# Patient Record
Sex: FEMALE | Race: WHITE
[De-identification: names, ages, dates, MRNs, and addresses within clinical notes are randomized per-mention and may not be internally consistent; named-entity substitution may affect disease eponyms.]

---

## 2017-02-28 ENCOUNTER — TRANSCRIPTION ENCOUNTER (OUTPATIENT)
Age: 62
End: 2017-02-28

## 2017-05-08 ENCOUNTER — RX RENEWAL (OUTPATIENT)
Age: 62
End: 2017-05-08

## 2017-05-09 ENCOUNTER — RX RENEWAL (OUTPATIENT)
Age: 62
End: 2017-05-09

## 2017-06-08 ENCOUNTER — OUTPATIENT (OUTPATIENT)
Dept: OUTPATIENT SERVICES | Facility: HOSPITAL | Age: 62
LOS: 1 days | Discharge: HOME | End: 2017-06-08
Payer: COMMERCIAL

## 2017-06-08 DIAGNOSIS — K01.1 IMPACTED TEETH: ICD-10-CM

## 2017-06-08 PROCEDURE — 70486 CT MAXILLOFACIAL W/O DYE: CPT | Mod: 26

## 2017-07-17 ENCOUNTER — OUTPATIENT (OUTPATIENT)
Dept: OUTPATIENT SERVICES | Facility: HOSPITAL | Age: 62
LOS: 1 days | Discharge: HOME | End: 2017-07-17

## 2017-07-17 DIAGNOSIS — I10 ESSENTIAL (PRIMARY) HYPERTENSION: ICD-10-CM

## 2017-07-17 DIAGNOSIS — E53.8 DEFICIENCY OF OTHER SPECIFIED B GROUP VITAMINS: ICD-10-CM

## 2017-07-17 DIAGNOSIS — E23.2 DIABETES INSIPIDUS: ICD-10-CM

## 2017-07-17 DIAGNOSIS — E04.2 NONTOXIC MULTINODULAR GOITER: ICD-10-CM

## 2017-07-17 DIAGNOSIS — E55.9 VITAMIN D DEFICIENCY, UNSPECIFIED: ICD-10-CM

## 2017-07-31 ENCOUNTER — MED ADMIN CHARGE (OUTPATIENT)
Age: 62
End: 2017-07-31

## 2017-08-10 ENCOUNTER — RESULT REVIEW (OUTPATIENT)
Age: 62
End: 2017-08-10

## 2017-08-11 ENCOUNTER — APPOINTMENT (OUTPATIENT)
Dept: ULTRASOUND IMAGING | Facility: CLINIC | Age: 62
End: 2017-08-11
Payer: COMMERCIAL

## 2017-08-11 ENCOUNTER — APPOINTMENT (OUTPATIENT)
Dept: MAMMOGRAPHY | Facility: CLINIC | Age: 62
End: 2017-08-11
Payer: COMMERCIAL

## 2017-08-11 ENCOUNTER — OUTPATIENT (OUTPATIENT)
Dept: OUTPATIENT SERVICES | Facility: HOSPITAL | Age: 62
LOS: 1 days | End: 2017-08-11
Payer: COMMERCIAL

## 2017-08-11 DIAGNOSIS — Z00.8 ENCOUNTER FOR OTHER GENERAL EXAMINATION: ICD-10-CM

## 2017-08-11 PROCEDURE — G0279: CPT

## 2017-08-11 PROCEDURE — 76641 ULTRASOUND BREAST COMPLETE: CPT

## 2017-08-11 PROCEDURE — G0204: CPT | Mod: 26

## 2017-08-11 PROCEDURE — 76641 ULTRASOUND BREAST COMPLETE: CPT | Mod: 26,50

## 2017-08-11 PROCEDURE — G0279: CPT | Mod: 26

## 2017-08-11 PROCEDURE — 77066 DX MAMMO INCL CAD BI: CPT

## 2017-08-16 ENCOUNTER — APPOINTMENT (OUTPATIENT)
Dept: ULTRASOUND IMAGING | Facility: CLINIC | Age: 62
End: 2017-08-16

## 2017-08-22 ENCOUNTER — RESULT REVIEW (OUTPATIENT)
Age: 62
End: 2017-08-22

## 2017-08-22 ENCOUNTER — APPOINTMENT (OUTPATIENT)
Dept: ULTRASOUND IMAGING | Facility: CLINIC | Age: 62
End: 2017-08-22
Payer: COMMERCIAL

## 2017-08-22 ENCOUNTER — OUTPATIENT (OUTPATIENT)
Dept: OUTPATIENT SERVICES | Facility: HOSPITAL | Age: 62
LOS: 1 days | End: 2017-08-22
Payer: COMMERCIAL

## 2017-08-22 DIAGNOSIS — Z00.8 ENCOUNTER FOR OTHER GENERAL EXAMINATION: ICD-10-CM

## 2017-08-22 PROCEDURE — 77065 DX MAMMO INCL CAD UNI: CPT

## 2017-08-22 PROCEDURE — G0206: CPT | Mod: 26,LT

## 2017-08-22 PROCEDURE — 19083 BX BREAST 1ST LESION US IMAG: CPT | Mod: LT

## 2017-08-22 PROCEDURE — A4648: CPT

## 2017-08-22 PROCEDURE — 19083 BX BREAST 1ST LESION US IMAG: CPT

## 2017-08-28 ENCOUNTER — APPOINTMENT (OUTPATIENT)
Dept: BREAST CENTER | Facility: CLINIC | Age: 62
End: 2017-08-28
Payer: COMMERCIAL

## 2017-08-28 ENCOUNTER — FORM ENCOUNTER (OUTPATIENT)
Age: 62
End: 2017-08-28

## 2017-08-28 VITALS
HEART RATE: 80 BPM | SYSTOLIC BLOOD PRESSURE: 140 MMHG | WEIGHT: 194 LBS | DIASTOLIC BLOOD PRESSURE: 90 MMHG | HEIGHT: 61 IN | BODY MASS INDEX: 36.63 KG/M2

## 2017-08-28 DIAGNOSIS — Z83.511 FAMILY HISTORY OF GLAUCOMA: ICD-10-CM

## 2017-08-28 DIAGNOSIS — F15.90 OTHER STIMULANT USE, UNSPECIFIED, UNCOMPLICATED: ICD-10-CM

## 2017-08-28 DIAGNOSIS — Z83.3 FAMILY HISTORY OF DIABETES MELLITUS: ICD-10-CM

## 2017-08-28 DIAGNOSIS — Z82.3 FAMILY HISTORY OF STROKE: ICD-10-CM

## 2017-08-28 DIAGNOSIS — Z80.8 FAMILY HISTORY OF MALIGNANT NEOPLASM OF OTHER ORGANS OR SYSTEMS: ICD-10-CM

## 2017-08-28 PROCEDURE — 99244 OFF/OP CNSLTJ NEW/EST MOD 40: CPT

## 2017-08-28 RX ORDER — DESMOPRESSIN ACETATE 0.1 MG/1
0.1 TABLET ORAL
Refills: 0 | Status: ACTIVE | COMMUNITY

## 2017-08-29 ENCOUNTER — OUTPATIENT (OUTPATIENT)
Dept: OUTPATIENT SERVICES | Facility: HOSPITAL | Age: 62
LOS: 1 days | End: 2017-08-29
Payer: COMMERCIAL

## 2017-08-29 ENCOUNTER — APPOINTMENT (OUTPATIENT)
Dept: MRI IMAGING | Facility: CLINIC | Age: 62
End: 2017-08-29
Payer: COMMERCIAL

## 2017-08-29 DIAGNOSIS — Z00.8 ENCOUNTER FOR OTHER GENERAL EXAMINATION: ICD-10-CM

## 2017-08-29 PROCEDURE — 82565 ASSAY OF CREATININE: CPT

## 2017-08-29 PROCEDURE — 0159T: CPT | Mod: 26

## 2017-08-29 PROCEDURE — C8937: CPT

## 2017-08-29 PROCEDURE — C8908: CPT

## 2017-08-29 PROCEDURE — A9585: CPT

## 2017-08-29 PROCEDURE — 77059 MRI BREAST BILATERAL: CPT | Mod: 26

## 2017-09-05 ENCOUNTER — RESULT REVIEW (OUTPATIENT)
Age: 62
End: 2017-09-05

## 2017-09-05 ENCOUNTER — OUTPATIENT (OUTPATIENT)
Dept: OUTPATIENT SERVICES | Facility: HOSPITAL | Age: 62
LOS: 1 days | Discharge: HOME | End: 2017-09-05
Payer: COMMERCIAL

## 2017-09-05 DIAGNOSIS — C50.212 MALIGNANT NEOPLASM OF UPPER-INNER QUADRANT OF LEFT FEMALE BREAST: ICD-10-CM

## 2017-09-05 PROCEDURE — 88321 CONSLTJ&REPRT SLD PREP ELSWR: CPT

## 2017-09-06 ENCOUNTER — APPOINTMENT (OUTPATIENT)
Dept: BREAST CENTER | Facility: CLINIC | Age: 62
End: 2017-09-06
Payer: COMMERCIAL

## 2017-09-06 PROCEDURE — 99214 OFFICE O/P EST MOD 30 MIN: CPT

## 2017-09-08 LAB — SURGICAL PATHOLOGY FINAL REPORT - CH: SIGNIFICANT CHANGE UP

## 2017-09-15 ENCOUNTER — OUTPATIENT (OUTPATIENT)
Dept: OUTPATIENT SERVICES | Facility: HOSPITAL | Age: 62
LOS: 1 days | Discharge: HOME | End: 2017-09-15
Payer: COMMERCIAL

## 2017-09-15 DIAGNOSIS — Z86.79 PERSONAL HISTORY OF OTHER DISEASES OF THE CIRCULATORY SYSTEM: Chronic | ICD-10-CM

## 2017-09-15 DIAGNOSIS — C50.212 MALIGNANT NEOPLASM OF UPPER-INNER QUADRANT OF LEFT FEMALE BREAST: ICD-10-CM

## 2017-09-15 LAB
ALBUMIN SERPL ELPH-MCNC: 3.5 G/DL — SIGNIFICANT CHANGE UP (ref 3.3–5)
ALP SERPL-CCNC: 100 U/L — SIGNIFICANT CHANGE UP (ref 40–120)
ALT FLD-CCNC: 19 U/L — SIGNIFICANT CHANGE UP (ref 12–78)
ANION GAP SERPL CALC-SCNC: 3 MMOL/L — LOW (ref 5–17)
AST SERPL-CCNC: 13 U/L — LOW (ref 15–37)
BASOPHILS # BLD AUTO: 0.1 K/UL — SIGNIFICANT CHANGE UP (ref 0–0.2)
BASOPHILS NFR BLD AUTO: 1.4 % — SIGNIFICANT CHANGE UP (ref 0–2)
BILIRUB SERPL-MCNC: 0.6 MG/DL — SIGNIFICANT CHANGE UP (ref 0.2–1.2)
BUN SERPL-MCNC: 14 MG/DL — SIGNIFICANT CHANGE UP (ref 7–23)
CALCIUM SERPL-MCNC: 9 MG/DL — SIGNIFICANT CHANGE UP (ref 8.5–10.1)
CHLORIDE SERPL-SCNC: 108 MMOL/L — SIGNIFICANT CHANGE UP (ref 96–108)
CO2 SERPL-SCNC: 28 MMOL/L — SIGNIFICANT CHANGE UP (ref 22–31)
CREAT SERPL-MCNC: 0.79 MG/DL — SIGNIFICANT CHANGE UP (ref 0.5–1.3)
EOSINOPHIL # BLD AUTO: 0.7 K/UL — HIGH (ref 0–0.5)
EOSINOPHIL NFR BLD AUTO: 6.2 % — HIGH (ref 0–6)
GLUCOSE SERPL-MCNC: 95 MG/DL — SIGNIFICANT CHANGE UP (ref 70–99)
HCT VFR BLD CALC: 44.9 % — SIGNIFICANT CHANGE UP (ref 34.5–45)
HGB BLD-MCNC: 14.6 G/DL — SIGNIFICANT CHANGE UP (ref 11.5–15.5)
INR BLD: 1.17 RATIO — HIGH (ref 0.88–1.16)
LYMPHOCYTES # BLD AUTO: 3.3 K/UL — SIGNIFICANT CHANGE UP (ref 1–3.3)
LYMPHOCYTES # BLD AUTO: 30.4 % — SIGNIFICANT CHANGE UP (ref 13–44)
MCHC RBC-ENTMCNC: 29.4 PG — SIGNIFICANT CHANGE UP (ref 27–34)
MCHC RBC-ENTMCNC: 32.5 GM/DL — SIGNIFICANT CHANGE UP (ref 32–36)
MCV RBC AUTO: 90.7 FL — SIGNIFICANT CHANGE UP (ref 80–100)
MONOCYTES # BLD AUTO: 0.8 K/UL — SIGNIFICANT CHANGE UP (ref 0–0.9)
MONOCYTES NFR BLD AUTO: 7.5 % — SIGNIFICANT CHANGE UP (ref 2–14)
NEUTROPHILS # BLD AUTO: 5.9 K/UL — SIGNIFICANT CHANGE UP (ref 1.8–7.4)
NEUTROPHILS NFR BLD AUTO: 54.5 % — SIGNIFICANT CHANGE UP (ref 43–77)
PLATELET # BLD AUTO: 167 K/UL — SIGNIFICANT CHANGE UP (ref 150–400)
POTASSIUM SERPL-MCNC: 4.4 MMOL/L — SIGNIFICANT CHANGE UP (ref 3.5–5.3)
POTASSIUM SERPL-SCNC: 4.4 MMOL/L — SIGNIFICANT CHANGE UP (ref 3.5–5.3)
PROT SERPL-MCNC: 7.4 GM/DL — SIGNIFICANT CHANGE UP (ref 6–8.3)
PROTHROM AB SERPL-ACNC: 12.7 SEC — SIGNIFICANT CHANGE UP (ref 9.8–12.7)
RBC # BLD: 4.95 M/UL — SIGNIFICANT CHANGE UP (ref 3.8–5.2)
RBC # FLD: 11.9 % — SIGNIFICANT CHANGE UP (ref 10.3–14.5)
SODIUM SERPL-SCNC: 139 MMOL/L — SIGNIFICANT CHANGE UP (ref 135–145)
WBC # BLD: 10.8 K/UL — HIGH (ref 3.8–10.5)
WBC # FLD AUTO: 10.8 K/UL — HIGH (ref 3.8–10.5)

## 2017-09-15 PROCEDURE — 71020: CPT | Mod: 26

## 2017-09-15 PROCEDURE — 93010 ELECTROCARDIOGRAM REPORT: CPT

## 2017-09-15 NOTE — ASU PATIENT PROFILE, ADULT - PMH
Chronic atrial fibrillation    Chronic low back pain without sciatica, unspecified back pain laterality    Diabetes insipidus    Essential hypertension    Hyperlipidemia, unspecified hyperlipidemia type    Malignant neoplasm of left breast in female, estrogen receptor positive, unspecified site of breast    Palpitation    Situational anxiety    Thyroid cyst  drained  Varicose vein of leg  right lower extremity surgery Chronic atrial fibrillation    Chronic low back pain without sciatica, unspecified back pain laterality    Diabetes insipidus    Essential hypertension    Hyperlipidemia, unspecified hyperlipidemia type    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  Malignant neoplasm of left breast in female, estrogen receptor positive, unspecified site of breast    Palpitation    Situational anxiety    Thyroid cyst  drained  Varicose vein of leg  right lower extremity surgery

## 2017-09-15 NOTE — ASU PATIENT PROFILE, ADULT - VISION (WITH CORRECTIVE LENSES IF THE PATIENT USUALLY WEARS THEM):
Normal vision: sees adequately in most situations; can see medication labels, newsprint/progressive lenses

## 2017-09-21 ENCOUNTER — FORM ENCOUNTER (OUTPATIENT)
Age: 62
End: 2017-09-21

## 2017-09-22 ENCOUNTER — APPOINTMENT (OUTPATIENT)
Dept: ULTRASOUND IMAGING | Facility: CLINIC | Age: 62
End: 2017-09-22
Payer: COMMERCIAL

## 2017-09-22 ENCOUNTER — OUTPATIENT (OUTPATIENT)
Dept: OUTPATIENT SERVICES | Facility: HOSPITAL | Age: 62
LOS: 1 days | End: 2017-09-22
Payer: COMMERCIAL

## 2017-09-22 DIAGNOSIS — Z86.79 PERSONAL HISTORY OF OTHER DISEASES OF THE CIRCULATORY SYSTEM: Chronic | ICD-10-CM

## 2017-09-22 DIAGNOSIS — Z00.8 ENCOUNTER FOR OTHER GENERAL EXAMINATION: ICD-10-CM

## 2017-09-22 PROCEDURE — C1739: CPT

## 2017-09-22 PROCEDURE — 19285 PERQ DEV BREAST 1ST US IMAG: CPT

## 2017-09-22 PROCEDURE — 19285 PERQ DEV BREAST 1ST US IMAG: CPT | Mod: LT

## 2017-09-26 PROBLEM — Z13.79 GENETIC TESTING: Status: RESOLVED | Noted: 2017-09-26 | Resolved: 2017-09-26

## 2017-09-28 RX ORDER — ONDANSETRON 8 MG/1
4 TABLET, FILM COATED ORAL ONCE
Qty: 0 | Refills: 0 | Status: COMPLETED | OUTPATIENT
Start: 2017-09-29 | End: 2017-09-29

## 2017-09-28 RX ORDER — SODIUM CHLORIDE 9 MG/ML
1000 INJECTION, SOLUTION INTRAVENOUS
Qty: 0 | Refills: 0 | Status: DISCONTINUED | OUTPATIENT
Start: 2017-09-29 | End: 2017-09-29

## 2017-09-29 ENCOUNTER — OUTPATIENT (OUTPATIENT)
Dept: OUTPATIENT SERVICES | Facility: HOSPITAL | Age: 62
LOS: 1 days | Discharge: HOME | End: 2017-09-29
Payer: COMMERCIAL

## 2017-09-29 ENCOUNTER — RESULT REVIEW (OUTPATIENT)
Age: 62
End: 2017-09-29

## 2017-09-29 ENCOUNTER — APPOINTMENT (OUTPATIENT)
Dept: BREAST CENTER | Facility: HOSPITAL | Age: 62
End: 2017-09-29
Payer: COMMERCIAL

## 2017-09-29 VITALS
OXYGEN SATURATION: 97 % | HEART RATE: 52 BPM | TEMPERATURE: 98 F | DIASTOLIC BLOOD PRESSURE: 73 MMHG | SYSTOLIC BLOOD PRESSURE: 136 MMHG | RESPIRATION RATE: 14 BRPM

## 2017-09-29 VITALS
WEIGHT: 197.09 LBS | HEIGHT: 61 IN | TEMPERATURE: 99 F | HEART RATE: 52 BPM | RESPIRATION RATE: 12 BRPM | DIASTOLIC BLOOD PRESSURE: 64 MMHG | SYSTOLIC BLOOD PRESSURE: 134 MMHG

## 2017-09-29 DIAGNOSIS — Z13.79 ENCOUNTER FOR OTHER SCREENING FOR GENETIC AND CHROMOSOMAL ANOMALIES: ICD-10-CM

## 2017-09-29 DIAGNOSIS — Z86.79 PERSONAL HISTORY OF OTHER DISEASES OF THE CIRCULATORY SYSTEM: Chronic | ICD-10-CM

## 2017-09-29 PROCEDURE — 88305 TISSUE EXAM BY PATHOLOGIST: CPT | Mod: 26

## 2017-09-29 PROCEDURE — 38792 RA TRACER ID OF SENTINL NODE: CPT

## 2017-09-29 PROCEDURE — 88329 PATH CONSLTJ DRG SURG: CPT

## 2017-09-29 PROCEDURE — 38525 BIOPSY/REMOVAL LYMPH NODES: CPT

## 2017-09-29 PROCEDURE — 19499 UNLISTED PROCEDURE BREAST: CPT

## 2017-09-29 PROCEDURE — 76098 X-RAY EXAM SURGICAL SPECIMEN: CPT | Mod: 26

## 2017-09-29 PROCEDURE — 19301 PARTIAL MASTECTOMY: CPT

## 2017-09-29 PROCEDURE — 78806: CPT | Mod: 26

## 2017-09-29 PROCEDURE — 88307 TISSUE EXAM BY PATHOLOGIST: CPT | Mod: 26

## 2017-09-29 RX ORDER — OXYCODONE HYDROCHLORIDE 5 MG/1
10 TABLET ORAL EVERY 6 HOURS
Qty: 0 | Refills: 0 | Status: DISCONTINUED | OUTPATIENT
Start: 2017-09-29 | End: 2017-09-29

## 2017-09-29 RX ORDER — ASPIRIN/CALCIUM CARB/MAGNESIUM 324 MG
1 TABLET ORAL
Qty: 0 | Refills: 0 | COMMUNITY

## 2017-09-29 RX ORDER — OXYCODONE AND ACETAMINOPHEN 5; 325 MG/1; MG/1
2 TABLET ORAL EVERY 6 HOURS
Qty: 0 | Refills: 0 | Status: DISCONTINUED | OUTPATIENT
Start: 2017-09-29 | End: 2017-09-29

## 2017-09-29 RX ORDER — OXYCODONE HYDROCHLORIDE 5 MG/1
1 TABLET ORAL
Qty: 20 | Refills: 0 | OUTPATIENT
Start: 2017-09-29

## 2017-09-29 RX ORDER — FENTANYL CITRATE 50 UG/ML
50 INJECTION INTRAVENOUS
Qty: 0 | Refills: 0 | Status: DISCONTINUED | OUTPATIENT
Start: 2017-09-29 | End: 2017-09-29

## 2017-09-29 RX ORDER — ONDANSETRON 8 MG/1
4 TABLET, FILM COATED ORAL EVERY 6 HOURS
Qty: 0 | Refills: 0 | Status: DISCONTINUED | OUTPATIENT
Start: 2017-09-29 | End: 2017-10-14

## 2017-09-29 RX ORDER — OXYCODONE AND ACETAMINOPHEN 5; 325 MG/1; MG/1
1 TABLET ORAL EVERY 4 HOURS
Qty: 0 | Refills: 0 | Status: DISCONTINUED | OUTPATIENT
Start: 2017-09-29 | End: 2017-09-29

## 2017-09-29 RX ORDER — ACETAMINOPHEN 500 MG
1000 TABLET ORAL ONCE
Qty: 0 | Refills: 0 | Status: COMPLETED | OUTPATIENT
Start: 2017-09-29 | End: 2017-09-29

## 2017-09-29 RX ADMIN — Medication 400 MILLIGRAM(S): at 16:30

## 2017-09-29 RX ADMIN — SODIUM CHLORIDE 75 MILLILITER(S): 9 INJECTION, SOLUTION INTRAVENOUS at 16:15

## 2017-09-29 RX ADMIN — ONDANSETRON 4 MILLIGRAM(S): 8 TABLET, FILM COATED ORAL at 15:53

## 2017-09-29 NOTE — ASU PATIENT PROFILE, ADULT - VISION (WITH CORRECTIVE LENSES IF THE PATIENT USUALLY WEARS THEM):
progressive lenses/Normal vision: sees adequately in most situations; can see medication labels, newsprint

## 2017-09-29 NOTE — ASU PATIENT PROFILE, ADULT - PMH
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  Malignant neoplasm of left breast in female, estrogen receptor positive, unspecified site of breast    Palpitation    Situational anxiety    Thyroid cyst  drained  Varicose vein of leg  right lower extremity surgery

## 2017-09-29 NOTE — BRIEF OPERATIVE NOTE - OPERATION/FINDINGS
Localizing clip and savvi clip seen on specimen radiograph.  Margin probe showed a positive medial margin.

## 2017-09-29 NOTE — ASU DISCHARGE PLAN (ADULT/PEDIATRIC). - MEDICATION SUMMARY - MEDICATIONS TO TAKE
I will START or STAY ON the medications listed below when I get home from the hospital:    oxyCODONE 5 mg oral tablet  -- 1 -2 tab(s) by mouth every 4 hours, As Needed -for moderate pain MDD:12  -- Caution federal law prohibits the transfer of this drug to any person other  than the person for whom it was prescribed.  It is very important that you take or use this exactly as directed.  Do not skip doses or discontinue unless directed by your doctor.  May cause drowsiness.  Alcohol may intensify this effect.  Use care when operating dangerous machinery.  This prescription cannot be refilled.  Using more of this medication than prescribed may cause serious breathing problems.    -- Indication: For postop pain    sotalol 120 mg oral tablet  -- 1 tab(s) by mouth 2 times a day  -- Indication: For arrythmia    desmopressin 0.1 mg oral tablet  -- 1 tab(s) by mouth 2 times a day  -- Indication: For DI

## 2017-09-29 NOTE — BRIEF OPERATIVE NOTE - PROCEDURE
<<-----Click on this checkbox to enter Procedure Biopsy of axillary sentinel lymph node  09/29/2017  Left  Active  [facility]  Lumpectomy  09/29/2017  Left lumpectomy with savvi localization and margin assessment with Margin Probe  Active  [facility]

## 2017-10-05 ENCOUNTER — APPOINTMENT (OUTPATIENT)
Dept: BREAST CENTER | Facility: CLINIC | Age: 62
End: 2017-10-05
Payer: COMMERCIAL

## 2017-10-05 LAB — SURGICAL PATHOLOGY FINAL REPORT - CH: SIGNIFICANT CHANGE UP

## 2017-10-05 PROCEDURE — 99024 POSTOP FOLLOW-UP VISIT: CPT

## 2017-10-11 DIAGNOSIS — D05.12 INTRADUCTAL CARCINOMA IN SITU OF LEFT BREAST: ICD-10-CM

## 2017-10-11 DIAGNOSIS — E23.2 DIABETES INSIPIDUS: ICD-10-CM

## 2017-10-11 DIAGNOSIS — Z79.82 LONG TERM (CURRENT) USE OF ASPIRIN: ICD-10-CM

## 2017-10-11 DIAGNOSIS — I48.0 PAROXYSMAL ATRIAL FIBRILLATION: ICD-10-CM

## 2017-10-11 DIAGNOSIS — I10 ESSENTIAL (PRIMARY) HYPERTENSION: ICD-10-CM

## 2017-10-25 PROBLEM — I48.91 ATRIAL FIBRILLATION: Status: ACTIVE | Noted: 2017-05-08

## 2017-10-30 ENCOUNTER — APPOINTMENT (OUTPATIENT)
Dept: HEMATOLOGY ONCOLOGY | Facility: CLINIC | Age: 62
End: 2017-10-30
Payer: COMMERCIAL

## 2017-10-30 DIAGNOSIS — I48.91 UNSPECIFIED ATRIAL FIBRILLATION: ICD-10-CM

## 2017-11-03 ENCOUNTER — APPOINTMENT (OUTPATIENT)
Dept: HEMATOLOGY ONCOLOGY | Facility: CLINIC | Age: 62
End: 2017-11-03
Payer: COMMERCIAL

## 2017-11-03 VITALS
WEIGHT: 195 LBS | HEIGHT: 61 IN | BODY MASS INDEX: 36.82 KG/M2 | TEMPERATURE: 98.3 F | SYSTOLIC BLOOD PRESSURE: 132 MMHG | HEART RATE: 108 BPM | DIASTOLIC BLOOD PRESSURE: 95 MMHG

## 2017-11-03 DIAGNOSIS — Z78.9 OTHER SPECIFIED HEALTH STATUS: ICD-10-CM

## 2017-11-03 PROCEDURE — 99244 OFF/OP CNSLTJ NEW/EST MOD 40: CPT

## 2017-12-03 ENCOUNTER — EMERGENCY (EMERGENCY)
Facility: HOSPITAL | Age: 62
LOS: 0 days | Discharge: HOME | End: 2017-12-03
Attending: EMERGENCY MEDICINE | Admitting: EMERGENCY MEDICINE
Payer: COMMERCIAL

## 2017-12-03 VITALS — WEIGHT: 195.11 LBS | HEIGHT: 61 IN

## 2017-12-03 VITALS — SYSTOLIC BLOOD PRESSURE: 154 MMHG | DIASTOLIC BLOOD PRESSURE: 78 MMHG

## 2017-12-03 DIAGNOSIS — I10 ESSENTIAL (PRIMARY) HYPERTENSION: ICD-10-CM

## 2017-12-03 DIAGNOSIS — R51 HEADACHE: ICD-10-CM

## 2017-12-03 DIAGNOSIS — Z86.79 PERSONAL HISTORY OF OTHER DISEASES OF THE CIRCULATORY SYSTEM: Chronic | ICD-10-CM

## 2017-12-03 DIAGNOSIS — I48.91 UNSPECIFIED ATRIAL FIBRILLATION: ICD-10-CM

## 2017-12-03 PROCEDURE — 99285 EMERGENCY DEPT VISIT HI MDM: CPT

## 2017-12-03 PROCEDURE — 70450 CT HEAD/BRAIN W/O DYE: CPT | Mod: 26

## 2017-12-03 PROCEDURE — 93010 ELECTROCARDIOGRAM REPORT: CPT

## 2017-12-03 RX ORDER — AMLODIPINE BESYLATE 2.5 MG/1
1 TABLET ORAL
Qty: 15 | Refills: 0 | OUTPATIENT
Start: 2017-12-03

## 2017-12-03 RX ORDER — ACETAMINOPHEN 500 MG
1000 TABLET ORAL ONCE
Qty: 0 | Refills: 0 | Status: COMPLETED | OUTPATIENT
Start: 2017-12-03 | End: 2017-12-03

## 2017-12-03 RX ORDER — AMLODIPINE BESYLATE 2.5 MG/1
5 TABLET ORAL ONCE
Qty: 0 | Refills: 0 | Status: COMPLETED | OUTPATIENT
Start: 2017-12-03 | End: 2017-12-03

## 2017-12-03 RX ADMIN — AMLODIPINE BESYLATE 5 MILLIGRAM(S): 2.5 TABLET ORAL at 20:50

## 2017-12-03 RX ADMIN — Medication 1000 MILLIGRAM(S): at 20:51

## 2017-12-03 NOTE — ED STATDOCS - OBJECTIVE STATEMENT
61 y/o female with a PMHx of Afib presents to the ED c/o hypertension x 3 days.  Pt notes high of 195. Took Lisinopril 40mg twice today x few days with no relief. +HA. No visual changes. On 120mg Sotalol BIB, ASA. denies fever. denies neck pain. no chest pain or sob. no abd pain. no n/v/d. no urinary f/u/d. no back pain. no motor or sensory deficits. denies illicit drug use. no recent travel. no rash. no other acute issues symptoms or concerns

## 2017-12-03 NOTE — ED STATDOCS - MEDICAL DECISION MAKING DETAILS
return to ed for intractable HA, persistent vomiting, or new onset motor/sensory deficits   neuro exam showing neuro: CN II - XII intact, EOMI, PERRL, no papilledema, 5/5 muscle strength x 4 extremities, no sensory deficits, 2+ dtr globally, negative babinski, no ataxic gait, normal ZACHARY and FNT, normal romberg

## 2017-12-03 NOTE — ED STATDOCS - PROGRESS NOTE DETAILS
Patient seen and evaluated.  Head CT reviewed, no acute findings.  BP improved to 158/78.  Will give tylenol for HA and write rx for lisinopril.  She will follow up with her PMD Dr. Kavitha Mckenna PA-C

## 2017-12-03 NOTE — ED STATDOCS - ATTENDING CONTRIBUTION TO CARE
I, Blas Dia, performed the initial face to face bedside interview with this patient regarding history of present illness, review of symptoms and relevant past medical, social and family history.  I completed an independent physical examination.  I was the initial provider who evaluated this patient.  The history, relevant review of systems, past medical and surgical history, medical decision making, and physical examination was documented by the scribe in my presence and I attest to the accuracy of the documentation.  I have signed out the follow up of any pending tests (i.e. labs, radiological studies) to the ACP.  I have communicated the patient’s plan of care and disposition with the ACP.

## 2017-12-03 NOTE — ED STATDOCS - NEUROLOGICAL, MLM
neuro: CN II - XII intact, EOMI, PERRL, no papilledema, 5/5 muscle strength x 4 extremities, no sensory deficits, 2+ dtr globally, negative babinski, no ataxic gait, normal ZACHARY and FNT, normal romberg

## 2018-01-25 ENCOUNTER — RESULT REVIEW (OUTPATIENT)
Age: 63
End: 2018-01-25

## 2018-02-13 ENCOUNTER — LABORATORY RESULT (OUTPATIENT)
Age: 63
End: 2018-02-13

## 2018-02-13 ENCOUNTER — APPOINTMENT (OUTPATIENT)
Dept: BREAST CENTER | Facility: CLINIC | Age: 63
End: 2018-02-13
Payer: COMMERCIAL

## 2018-02-13 VITALS
DIASTOLIC BLOOD PRESSURE: 78 MMHG | HEART RATE: 86 BPM | WEIGHT: 195 LBS | HEIGHT: 61 IN | BODY MASS INDEX: 36.82 KG/M2 | SYSTOLIC BLOOD PRESSURE: 132 MMHG

## 2018-02-13 PROCEDURE — 99214 OFFICE O/P EST MOD 30 MIN: CPT | Mod: 25

## 2018-02-13 PROCEDURE — 76642 ULTRASOUND BREAST LIMITED: CPT

## 2018-02-13 PROCEDURE — 10022: CPT

## 2018-02-16 ENCOUNTER — OUTPATIENT (OUTPATIENT)
Dept: OUTPATIENT SERVICES | Facility: HOSPITAL | Age: 63
LOS: 1 days | Discharge: HOME | End: 2018-02-16

## 2018-02-16 DIAGNOSIS — E55.9 VITAMIN D DEFICIENCY, UNSPECIFIED: ICD-10-CM

## 2018-02-16 DIAGNOSIS — E04.2 NONTOXIC MULTINODULAR GOITER: ICD-10-CM

## 2018-02-16 DIAGNOSIS — E53.8 DEFICIENCY OF OTHER SPECIFIED B GROUP VITAMINS: ICD-10-CM

## 2018-02-16 DIAGNOSIS — Z86.79 PERSONAL HISTORY OF OTHER DISEASES OF THE CIRCULATORY SYSTEM: Chronic | ICD-10-CM

## 2018-02-16 DIAGNOSIS — E23.2 DIABETES INSIPIDUS: ICD-10-CM

## 2018-02-26 ENCOUNTER — MEDICATION RENEWAL (OUTPATIENT)
Age: 63
End: 2018-02-26

## 2018-02-26 RX ORDER — SOTALOL HYDROCHLORIDE 120 MG/1
120 TABLET ORAL TWICE DAILY
Qty: 60 | Refills: 5 | Status: ACTIVE | COMMUNITY
Start: 2017-07-31 | End: 1900-01-01

## 2018-03-01 ENCOUNTER — TRANSCRIPTION ENCOUNTER (OUTPATIENT)
Age: 63
End: 2018-03-01

## 2018-07-23 PROBLEM — Z78.9 ALCOHOL USE: Status: ACTIVE | Noted: 2017-08-28

## 2018-07-30 ENCOUNTER — INPATIENT (INPATIENT)
Facility: HOSPITAL | Age: 63
LOS: 0 days | Discharge: HOME | End: 2018-07-31
Attending: INTERNAL MEDICINE | Admitting: INTERNAL MEDICINE
Payer: COMMERCIAL

## 2018-07-30 VITALS
WEIGHT: 149.91 LBS | OXYGEN SATURATION: 99 % | SYSTOLIC BLOOD PRESSURE: 176 MMHG | HEIGHT: 60 IN | DIASTOLIC BLOOD PRESSURE: 94 MMHG | HEART RATE: 63 BPM

## 2018-07-30 DIAGNOSIS — Z86.79 PERSONAL HISTORY OF OTHER DISEASES OF THE CIRCULATORY SYSTEM: Chronic | ICD-10-CM

## 2018-07-30 PROBLEM — D76.3 OTHER HISTIOCYTOSIS SYNDROMES: Chronic | Status: ACTIVE | Noted: 2017-09-15

## 2018-07-30 PROBLEM — I83.90 ASYMPTOMATIC VARICOSE VEINS OF UNSPECIFIED LOWER EXTREMITY: Chronic | Status: ACTIVE | Noted: 2017-09-15

## 2018-07-30 PROBLEM — E78.5 HYPERLIPIDEMIA, UNSPECIFIED: Chronic | Status: ACTIVE | Noted: 2017-09-15

## 2018-07-30 PROBLEM — I10 ESSENTIAL (PRIMARY) HYPERTENSION: Chronic | Status: ACTIVE | Noted: 2017-09-15

## 2018-07-30 PROBLEM — E23.2 DIABETES INSIPIDUS: Chronic | Status: ACTIVE | Noted: 2017-09-15

## 2018-07-30 PROBLEM — I48.2 CHRONIC ATRIAL FIBRILLATION: Chronic | Status: ACTIVE | Noted: 2017-09-15

## 2018-07-30 PROBLEM — M54.5 LOW BACK PAIN: Chronic | Status: ACTIVE | Noted: 2017-09-15

## 2018-07-30 PROBLEM — R00.2 PALPITATIONS: Chronic | Status: ACTIVE | Noted: 2017-09-15

## 2018-07-30 PROBLEM — C50.912 MALIGNANT NEOPLASM OF UNSPECIFIED SITE OF LEFT FEMALE BREAST: Chronic | Status: ACTIVE | Noted: 2017-09-15

## 2018-07-30 LAB
ALBUMIN SERPL ELPH-MCNC: 3.6 G/DL — SIGNIFICANT CHANGE UP (ref 3.3–5)
ALLERGY+IMMUNOLOGY DIAG STUDY NOTE: SIGNIFICANT CHANGE UP
ALP SERPL-CCNC: 111 U/L — SIGNIFICANT CHANGE UP (ref 40–120)
ALT FLD-CCNC: 24 U/L — SIGNIFICANT CHANGE UP (ref 12–78)
ANION GAP SERPL CALC-SCNC: 6 MMOL/L — SIGNIFICANT CHANGE UP (ref 5–17)
AST SERPL-CCNC: 15 U/L — SIGNIFICANT CHANGE UP (ref 15–37)
BASOPHILS # BLD AUTO: 0.07 K/UL — SIGNIFICANT CHANGE UP (ref 0–0.2)
BASOPHILS NFR BLD AUTO: 0.5 % — SIGNIFICANT CHANGE UP (ref 0–2)
BILIRUB SERPL-MCNC: 0.4 MG/DL — SIGNIFICANT CHANGE UP (ref 0.2–1.2)
BUN SERPL-MCNC: 16 MG/DL — SIGNIFICANT CHANGE UP (ref 7–23)
CALCIUM SERPL-MCNC: 9.1 MG/DL — SIGNIFICANT CHANGE UP (ref 8.5–10.1)
CHLORIDE SERPL-SCNC: 107 MMOL/L — SIGNIFICANT CHANGE UP (ref 96–108)
CO2 SERPL-SCNC: 29 MMOL/L — SIGNIFICANT CHANGE UP (ref 22–31)
CREAT SERPL-MCNC: 0.91 MG/DL — SIGNIFICANT CHANGE UP (ref 0.5–1.3)
EOSINOPHIL # BLD AUTO: 0.39 K/UL — SIGNIFICANT CHANGE UP (ref 0–0.5)
EOSINOPHIL NFR BLD AUTO: 3.1 % — SIGNIFICANT CHANGE UP (ref 0–6)
GLUCOSE BLDC GLUCOMTR-MCNC: 87 MG/DL — SIGNIFICANT CHANGE UP (ref 70–99)
GLUCOSE SERPL-MCNC: 101 MG/DL — HIGH (ref 70–99)
HCT VFR BLD CALC: 43.3 % — SIGNIFICANT CHANGE UP (ref 34.5–45)
HGB BLD-MCNC: 14.2 G/DL — SIGNIFICANT CHANGE UP (ref 11.5–15.5)
IMM GRANULOCYTES NFR BLD AUTO: 0.5 % — SIGNIFICANT CHANGE UP (ref 0–1.5)
LYMPHOCYTES # BLD AUTO: 2.84 K/UL — SIGNIFICANT CHANGE UP (ref 1–3.3)
LYMPHOCYTES # BLD AUTO: 22.3 % — SIGNIFICANT CHANGE UP (ref 13–44)
MCHC RBC-ENTMCNC: 29.7 PG — SIGNIFICANT CHANGE UP (ref 27–34)
MCHC RBC-ENTMCNC: 32.8 GM/DL — SIGNIFICANT CHANGE UP (ref 32–36)
MCV RBC AUTO: 90.6 FL — SIGNIFICANT CHANGE UP (ref 80–100)
MONOCYTES # BLD AUTO: 1.26 K/UL — HIGH (ref 0–0.9)
MONOCYTES NFR BLD AUTO: 9.9 % — SIGNIFICANT CHANGE UP (ref 2–14)
NEUTROPHILS # BLD AUTO: 8.1 K/UL — HIGH (ref 1.8–7.4)
NEUTROPHILS NFR BLD AUTO: 63.7 % — SIGNIFICANT CHANGE UP (ref 43–77)
NRBC # BLD: 0 /100 WBCS — SIGNIFICANT CHANGE UP (ref 0–0)
PLATELET # BLD AUTO: 151 K/UL — SIGNIFICANT CHANGE UP (ref 150–400)
POTASSIUM SERPL-MCNC: 4.1 MMOL/L — SIGNIFICANT CHANGE UP (ref 3.5–5.3)
POTASSIUM SERPL-SCNC: 4.1 MMOL/L — SIGNIFICANT CHANGE UP (ref 3.5–5.3)
PROT SERPL-MCNC: 7.9 GM/DL — SIGNIFICANT CHANGE UP (ref 6–8.3)
RBC # BLD: 4.78 M/UL — SIGNIFICANT CHANGE UP (ref 3.8–5.2)
RBC # FLD: 13 % — SIGNIFICANT CHANGE UP (ref 10.3–14.5)
SODIUM SERPL-SCNC: 142 MMOL/L — SIGNIFICANT CHANGE UP (ref 135–145)
TROPONIN I SERPL-MCNC: <0.015 NG/ML — SIGNIFICANT CHANGE UP (ref 0.01–0.04)
WBC # BLD: 12.73 K/UL — HIGH (ref 3.8–10.5)
WBC # FLD AUTO: 12.73 K/UL — HIGH (ref 3.8–10.5)

## 2018-07-30 PROCEDURE — 99285 EMERGENCY DEPT VISIT HI MDM: CPT

## 2018-07-30 PROCEDURE — 93010 ELECTROCARDIOGRAM REPORT: CPT

## 2018-07-30 PROCEDURE — 70551 MRI BRAIN STEM W/O DYE: CPT | Mod: 26

## 2018-07-30 PROCEDURE — 70547 MR ANGIOGRAPHY NECK W/O DYE: CPT | Mod: 26

## 2018-07-30 PROCEDURE — 71045 X-RAY EXAM CHEST 1 VIEW: CPT | Mod: 26

## 2018-07-30 PROCEDURE — 70450 CT HEAD/BRAIN W/O DYE: CPT | Mod: 26

## 2018-07-30 PROCEDURE — 93306 TTE W/DOPPLER COMPLETE: CPT | Mod: 26

## 2018-07-30 PROCEDURE — 70544 MR ANGIOGRAPHY HEAD W/O DYE: CPT | Mod: 26,59

## 2018-07-30 RX ORDER — ASPIRIN/CALCIUM CARB/MAGNESIUM 324 MG
325 TABLET ORAL DAILY
Qty: 0 | Refills: 0 | Status: DISCONTINUED | OUTPATIENT
Start: 2018-07-30 | End: 2018-07-31

## 2018-07-30 RX ORDER — DESMOPRESSIN ACETATE 0.1 MG/1
0.1 TABLET ORAL
Qty: 0 | Refills: 0 | Status: DISCONTINUED | OUTPATIENT
Start: 2018-07-30 | End: 2018-07-31

## 2018-07-30 RX ORDER — SOTALOL HCL 120 MG
120 TABLET ORAL
Qty: 0 | Refills: 0 | Status: DISCONTINUED | OUTPATIENT
Start: 2018-07-30 | End: 2018-07-31

## 2018-07-30 RX ORDER — ANASTROZOLE 1 MG/1
1 TABLET ORAL DAILY
Qty: 0 | Refills: 0 | Status: DISCONTINUED | OUTPATIENT
Start: 2018-07-30 | End: 2018-07-31

## 2018-07-30 RX ORDER — ATORVASTATIN CALCIUM 80 MG/1
40 TABLET, FILM COATED ORAL AT BEDTIME
Qty: 0 | Refills: 0 | Status: DISCONTINUED | OUTPATIENT
Start: 2018-07-30 | End: 2018-07-31

## 2018-07-30 RX ADMIN — DESMOPRESSIN ACETATE 0.1 MILLIGRAM(S): 0.1 TABLET ORAL at 21:06

## 2018-07-30 RX ADMIN — Medication 120 MILLIGRAM(S): at 19:54

## 2018-07-30 RX ADMIN — Medication 325 MILLIGRAM(S): at 13:50

## 2018-07-30 RX ADMIN — ATORVASTATIN CALCIUM 40 MILLIGRAM(S): 80 TABLET, FILM COATED ORAL at 21:06

## 2018-07-30 NOTE — ED ADULT NURSE NOTE - CHPI ED SYMPTOMS NEG
no vomiting/no confusion/no loss of consciousness/no change in level of consciousness/no blurred vision/no numbness/no dizziness/no weakness/no fever/no nausea

## 2018-07-30 NOTE — H&P ADULT - ASSESSMENT
63 y.o. female with PMH AFib on ASA and sotolol, back pain, diabetes insipidus, HTN, HLD, L breast ca s/p lumpectomy presents with expressive aphasia, garbled speech. Pt was working the night shift with symptoms starting around 0515 this morning. She comprehends but was unable to say it. Pt came to ED for further eval. NIHSS 1 in the ED. Currently, pt's symptoms resolved. +HA. Pt denies fever, chills, CP, SOB, abd pain, dysuria. Pt reports chronic right sided facial droop and reports symptoms since age 5. Pt states she was very sick at that time and her facial bones/skull were deformed. She had neck surgery to drain fluid around that time as well.    On ROS, pt reports occasional acute elevated blood pressure. Reports that she takes norvasc during those occasions. States she uses norvasc once every 2-3 months and during those 2-3 months, BP normal.    #expressive aphasia concerning for TIA  -admit to tele  -stat ASA - not given in ED  -add lipitor 40mg qhs  -check lipid panel  -echo  -MRI/A brain  -neuro checks  -neuro consult, Dr Quezada appreciated  -cardio consult, Dr Plummer  -EKG: NSR HR 66, Q V1-2, III    #diabetes insipidus  -cont desmopressin    #AFib  -rate controlled on sotolol    #leukocytosis  -check UA    #left breast ca  -on anastrozole    #DVT ppx  -SCDs

## 2018-07-30 NOTE — H&P ADULT - NSHPPHYSICALEXAM_GEN_ALL_CORE
Vital Signs Last 24 Hrs  T(C): 36.7 (30 Jul 2018 10:39), Max: 36.8 (30 Jul 2018 08:53)  T(F): 98.1 (30 Jul 2018 10:39), Max: 98.3 (30 Jul 2018 08:53)  HR: 60 (30 Jul 2018 10:39) (60 - 63)  BP: 152/95 (30 Jul 2018 10:39) (152/95 - 176/94)  BP(mean): --  RR: 17 (30 Jul 2018 10:39) (17 - 17)  SpO2: 99% (30 Jul 2018 10:39) (99% - 99%)    GEN: appears comfortable  Neuro: AAOx3, right facial droop at lips, strength intact, sensation intact  HEENT: NC/AT, EOMI  Neck: no thyroidmegaly, no JVD  Cardiovascular: S1S2 present, regular rhythm, no murmur  Respiratory: breath sounds normal bilaterally, no wheezing, no rales, no rhonchi  Gastrointestinal: bowel sounds normal, soft, no abdominal tenderness  Musculoskeletal: no muscle tenderness  Extremities: No edema; pt reports uses ACE bandage on right leg due to some swelling due to missing ligament  Skin: No rash

## 2018-07-30 NOTE — ED PROVIDER NOTE - CHPI ED SYMPTOMS NEG
no weakness/no confusion/no numbness/no blurred vision/no dizziness/no fever/no loss of consciousness/no nausea/no change in level of consciousness/no vomiting

## 2018-07-30 NOTE — ED PROVIDER NOTE - MEDICAL DECISION MAKING DETAILS
Stat head CT.  Pt not Tpa candidate as her symptoms have resolved.  Pt evaluated by Dr. Rutledge in ED and will need MRI/MRA on admission

## 2018-07-30 NOTE — ED PROVIDER NOTE - OBJECTIVE STATEMENT
64 y/o female with h/o Afib on sotalol and  mg daily presents with dysarthria and expressive aphasia that began at approximately 0515 today while at work and is now improving.  Pt also had a PETER and elevated BP during the onset of symptoms.  Pt denies any other symptoms to include weakness, numbness/tingling, ataxia, chest pain or SOB.  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  She has a history of a slight right facial droop which is apparently unchanged per the patient and nurse manager.

## 2018-07-30 NOTE — ED ADULT NURSE NOTE - OBJECTIVE STATEMENT
Patient presents with difficulty speaking, slight slurred speech and slight facial droop. Patient states left sided facial droop is baseline. Patient denies Hx of TIA/CVA. Patient denies any tingling, numbness, chest pain, or SOB. Patient does not show any weakness in upper or lower extremities. Patient presents with difficulty speaking, slurred speech and facial droop. Patient states left sided facial droop is baseline. Patient denies Hx of TIA/CVA. Patient denies any tingling, numbness, chest pain, or SOB. Patient does not show any weakness in upper or lower extremities. Patient presents with difficulty speaking, slurred speech and facial droop. Patient states facial aymmetry is baseline from childhood. Patient denies Hx of TIA/CVA. Patient denies any tingling, numbness, chest pain, or SOB. Patient does not show any weakness in upper or lower extremities.

## 2018-07-30 NOTE — H&P ADULT - HISTORY OF PRESENT ILLNESS
63 y.o. female with PMH AFib on ASA and sotolol, back pain, diabetes insipidus, HTN, HLD, L breast ca s/p lumpectomy presents with expressive aphasia, garbled speech. Pt was working the night shift with symptoms starting around 0515 this morning. She comprehends but was unable to say it. Pt came to ED for further eval. NIHSS 1 in the ED. Currently, pt's symptoms resolved. +HA. Pt denies fever, chills, CP, SOB, abd pain, dysuria. Pt reports chronic right sided facial droop and reports symptoms since age 5. Pt states she was very sick at that time and her facial bones/skull were deformed. She had neck surgery to drain fluid around that time as well.    On ROS, pt reports occasional acute elevated blood pressure. Reports that she takes norvasc during those occasions. States she uses norvasc once every 2-3 months and during those 2-3 months, BP normal.    PMH: as above  PSH: as above, back cyst removal, right varicose vein surgery 2015  Social Hx: no tobacco, EtOH, drugs  Family Hx: Father-stroke age 60; Mother-DM diet controlled  ROS: per HPI 63 y.o. female with PMH AFib on ASA and sotolol, back pain, diabetes insipidus, HTN, HLD, L breast ca s/p lumpectomy presents with expressive aphasia, garbled speech. Pt was working the night shift with symptoms starting around 0515 this morning. She comprehends but was unable to say it. Pt came to ED for further eval. NIHSS 1 in the ED. Currently, pt's symptoms resolved. +HA. Pt denies fever, chills, CP, SOB, abd pain, dysuria. Pt reports chronic right sided facial droop and reports symptoms since age 5. Pt states she was very sick at that time and her facial bones/skull were deformed. She had neck surgery to drain fluid around that time as well. Pt reports CT head findings likely unchanged from age 5.    On ROS, pt reports occasional acute elevated blood pressure. Reports that she takes norvasc during those occasions. States she uses norvasc once every 2-3 months and during those 2-3 months, BP normal.    PMH: as above  PSH: as above, back cyst removal, right varicose vein surgery 2015  Social Hx: no tobacco, EtOH, drugs  Family Hx: Father-stroke age 60; Mother-DM diet controlled  ROS: per HPI

## 2018-07-30 NOTE — ED ADULT TRIAGE NOTE - CHIEF COMPLAINT QUOTE
Hypertension/difficult speech this am Hypertension/difficult speech this am/evaluated by Dr Iyer not code stroke

## 2018-07-31 ENCOUNTER — TRANSCRIPTION ENCOUNTER (OUTPATIENT)
Age: 63
End: 2018-07-31

## 2018-07-31 VITALS
TEMPERATURE: 99 F | HEART RATE: 105 BPM | RESPIRATION RATE: 16 BRPM | DIASTOLIC BLOOD PRESSURE: 70 MMHG | SYSTOLIC BLOOD PRESSURE: 160 MMHG | OXYGEN SATURATION: 97 %

## 2018-07-31 LAB
ANION GAP SERPL CALC-SCNC: 7 MMOL/L — SIGNIFICANT CHANGE UP (ref 5–17)
BASOPHILS # BLD AUTO: 0.05 K/UL — SIGNIFICANT CHANGE UP (ref 0–0.2)
BASOPHILS NFR BLD AUTO: 0.5 % — SIGNIFICANT CHANGE UP (ref 0–2)
BUN SERPL-MCNC: 16 MG/DL — SIGNIFICANT CHANGE UP (ref 7–23)
CALCIUM SERPL-MCNC: 8.7 MG/DL — SIGNIFICANT CHANGE UP (ref 8.5–10.1)
CHLORIDE SERPL-SCNC: 108 MMOL/L — SIGNIFICANT CHANGE UP (ref 96–108)
CHOLEST SERPL-MCNC: 186 MG/DL — SIGNIFICANT CHANGE UP (ref 10–199)
CO2 SERPL-SCNC: 27 MMOL/L — SIGNIFICANT CHANGE UP (ref 22–31)
CREAT SERPL-MCNC: 0.72 MG/DL — SIGNIFICANT CHANGE UP (ref 0.5–1.3)
EOSINOPHIL # BLD AUTO: 0.32 K/UL — SIGNIFICANT CHANGE UP (ref 0–0.5)
EOSINOPHIL NFR BLD AUTO: 3.2 % — SIGNIFICANT CHANGE UP (ref 0–6)
ESTIMATED AVERAGE GLUCOSE: 120 MG/DL — HIGH (ref 68–114)
GLUCOSE SERPL-MCNC: 102 MG/DL — HIGH (ref 70–99)
HBA1C BLD-MCNC: 5.8 % — HIGH (ref 4–5.6)
HBA1C BLD-MCNC: 5.8 % — HIGH (ref 4–5.6)
HCT VFR BLD CALC: 44 % — SIGNIFICANT CHANGE UP (ref 34.5–45)
HDLC SERPL-MCNC: 52 MG/DL — SIGNIFICANT CHANGE UP (ref 40–125)
HGB BLD-MCNC: 14.3 G/DL — SIGNIFICANT CHANGE UP (ref 11.5–15.5)
IMM GRANULOCYTES NFR BLD AUTO: 0.5 % — SIGNIFICANT CHANGE UP (ref 0–1.5)
LIPID PNL WITH DIRECT LDL SERPL: 119 MG/DL — SIGNIFICANT CHANGE UP
LYMPHOCYTES # BLD AUTO: 2.39 K/UL — SIGNIFICANT CHANGE UP (ref 1–3.3)
LYMPHOCYTES # BLD AUTO: 23.9 % — SIGNIFICANT CHANGE UP (ref 13–44)
MCHC RBC-ENTMCNC: 29 PG — SIGNIFICANT CHANGE UP (ref 27–34)
MCHC RBC-ENTMCNC: 32.5 GM/DL — SIGNIFICANT CHANGE UP (ref 32–36)
MCV RBC AUTO: 89.2 FL — SIGNIFICANT CHANGE UP (ref 80–100)
MONOCYTES # BLD AUTO: 0.83 K/UL — SIGNIFICANT CHANGE UP (ref 0–0.9)
MONOCYTES NFR BLD AUTO: 8.3 % — SIGNIFICANT CHANGE UP (ref 2–14)
NEUTROPHILS # BLD AUTO: 6.34 K/UL — SIGNIFICANT CHANGE UP (ref 1.8–7.4)
NEUTROPHILS NFR BLD AUTO: 63.6 % — SIGNIFICANT CHANGE UP (ref 43–77)
NRBC # BLD: 0 /100 WBCS — SIGNIFICANT CHANGE UP (ref 0–0)
PLATELET # BLD AUTO: 149 K/UL — LOW (ref 150–400)
POTASSIUM SERPL-MCNC: 3.9 MMOL/L — SIGNIFICANT CHANGE UP (ref 3.5–5.3)
POTASSIUM SERPL-SCNC: 3.9 MMOL/L — SIGNIFICANT CHANGE UP (ref 3.5–5.3)
RBC # BLD: 4.93 M/UL — SIGNIFICANT CHANGE UP (ref 3.8–5.2)
RBC # FLD: 13.2 % — SIGNIFICANT CHANGE UP (ref 10.3–14.5)
SODIUM SERPL-SCNC: 142 MMOL/L — SIGNIFICANT CHANGE UP (ref 135–145)
TOTAL CHOLESTEROL/HDL RATIO MEASUREMENT: 3.6 RATIO — SIGNIFICANT CHANGE UP (ref 3.3–7.1)
TRIGL SERPL-MCNC: 77 MG/DL — SIGNIFICANT CHANGE UP (ref 10–149)
WBC # BLD: 9.98 K/UL — SIGNIFICANT CHANGE UP (ref 3.8–10.5)
WBC # FLD AUTO: 9.98 K/UL — SIGNIFICANT CHANGE UP (ref 3.8–10.5)

## 2018-07-31 PROCEDURE — 93010 ELECTROCARDIOGRAM REPORT: CPT

## 2018-07-31 PROCEDURE — 99233 SBSQ HOSP IP/OBS HIGH 50: CPT

## 2018-07-31 RX ORDER — ATORVASTATIN CALCIUM 80 MG/1
1 TABLET, FILM COATED ORAL
Qty: 30 | Refills: 0
Start: 2018-07-31

## 2018-07-31 RX ORDER — ASPIRIN/CALCIUM CARB/MAGNESIUM 324 MG
1 TABLET ORAL
Qty: 0 | Refills: 0 | COMMUNITY

## 2018-07-31 RX ORDER — RIVAROXABAN 15 MG-20MG
1 KIT ORAL
Qty: 30 | Refills: 0
Start: 2018-07-31

## 2018-07-31 RX ORDER — ASPIRIN/CALCIUM CARB/MAGNESIUM 324 MG
81 TABLET ORAL DAILY
Qty: 0 | Refills: 0 | Status: DISCONTINUED | OUTPATIENT
Start: 2018-07-31 | End: 2018-07-31

## 2018-07-31 RX ORDER — RIVAROXABAN 15 MG-20MG
20 KIT ORAL EVERY 24 HOURS
Qty: 0 | Refills: 0 | Status: DISCONTINUED | OUTPATIENT
Start: 2018-07-31 | End: 2018-07-31

## 2018-07-31 RX ADMIN — RIVAROXABAN 20 MILLIGRAM(S): KIT at 13:59

## 2018-07-31 RX ADMIN — DESMOPRESSIN ACETATE 0.1 MILLIGRAM(S): 0.1 TABLET ORAL at 05:30

## 2018-07-31 RX ADMIN — ANASTROZOLE 1 MILLIGRAM(S): 1 TABLET ORAL at 11:18

## 2018-07-31 RX ADMIN — Medication 81 MILLIGRAM(S): at 11:18

## 2018-07-31 RX ADMIN — Medication 120 MILLIGRAM(S): at 05:31

## 2018-07-31 RX ADMIN — DESMOPRESSIN ACETATE 0.1 MILLIGRAM(S): 0.1 TABLET ORAL at 13:59

## 2018-07-31 NOTE — DISCHARGE NOTE ADULT - HOSPITAL COURSE
Reason for Admission: expressive aphasia	  History of Present Illness: 	  63 y.o. female with PMH AFib on ASA and sotolol, back pain, diabetes insipidus, HTN, HLD, L breast ca s/p lumpectomy presents with expressive aphasia, garbled speech. Pt was working the night shift with symptoms starting around 0515 this morning. She comprehends but was unable to say it. Pt came to ED for further eval. NIHSS 1 in the ED. Currently, pt's symptoms resolved. +HA. Pt denies fever, chills, CP, SOB, abd pain, dysuria. Pt reports chronic right sided facial droop and reports symptoms since age 5. Pt states she was very sick at that time and her facial bones/skull were deformed. She had neck surgery to drain fluid around that time as well.    7/31: Pt with Pafib, will start anticoagulation.  FUrther work up negative for acute CVA.  Carotids show no stenosis.  outpatient cardio f/u.  HD stable, no complaints.   cbc/bmp/trops unremarkable.    Physical Exam:  REVIEW OF SYSTEMS: All other review of systems is negative unless indicated above.Vital Signs Last 24 Hrs  T(C): 37 (31 Jul 2018 10:12), Max: 37.1 (30 Jul 2018 15:47) T(F): 98.6 (31 Jul 2018 10:12), Max: 98.7 (30 Jul 2018 15:47) HR: 105 (31 Jul 2018 10:12) (63 - 105) BP: 160/70 (31 Jul 2018 10:12) (141/79 - 169/71) RR: 16 (31 Jul 2018 10:12) (16 - 24) SpO2: 97% (31 Jul 2018 10:12) (95% - 100%)   GEN: appears comfortable  Neuro: AAOx3, right facial droop at lips, strength intact, sensation intact  HEENT: NC/AT, EOMI  Neck: no thyroidmegaly, no JVD  Cardiovascular: S1S2 present, regular rhythm, no murmur  Respiratory: breath sounds normal bilaterally, no wheezing, no rales, no rhonchi  Gastrointestinal: bowel sounds normal, soft, no abdominal tenderness  Musculoskeletal: no muscle tenderness  Extremities: No edema; pt reports uses ACE bandage on right leg due to some swelling due to missing ligament Skin: No rash	    meds/labs: Reviewed    Assessment and Plan:  63 y.o. female with PMH AFib on ASA and sotolol, back pain, diabetes insipidus, HTN, HLD, L breast ca s/p lumpectomy presents with expressive aphasia, garbled speech.     #expressive aphasia concerning for TIA  tele --> pAfib, start DOAC.  stop full dose aspirin.  -add lipitor 40mg qhs for elevated lipid panel  -echo, MRI/A brain = no acute pathology  -neuro consult, Dr Quezada appreciated  -EKG: NSR HR 66, Q V1-2, III    #diabetes insipidus -cont desmopressin    #AFib -rate controlled on sotolol.  Start anticoagulation and f/u cardio.     #left breast ca -on anastrozole    Attending Statement: 40 minutes spent on total encounter

## 2018-07-31 NOTE — DISCHARGE NOTE ADULT - MEDICATION SUMMARY - MEDICATIONS TO TAKE
I will START or STAY ON the medications listed below when I get home from the hospital:    sotalol 120 mg oral tablet  -- 1 tab(s) by mouth 2 times a day  -- Indication: For atrial fib    rivaroxaban 20 mg oral tablet  -- 1 tab(s) by mouth every 24 hours  -- Indication: For atrial fib    desmopressin 0.1 mg oral tablet  -- 1 tab(s) by mouth 2-3 times a day  -- Indication: For continue home med    atorvastatin 40 mg oral tablet  -- 1 tab(s) by mouth once a day (at bedtime)  -- Indication: For hyperlipidemia    anastrozole 1 mg oral tablet  -- 1 tab(s) by mouth once a day  -- Indication: For resume home meds

## 2018-07-31 NOTE — DISCHARGE NOTE ADULT - CARE PLAN
Principal Discharge DX:	Transient cerebral ischemia, unspecified type  Goal:	med management.  Assessment and plan of treatment:	continue home meds.  start anticoagulation as prescribed (Sent to your pharmacy)  Secondary Diagnosis:	Chronic atrial fibrillation  Goal:	med management.  Assessment and plan of treatment:	resume home meds, hold aspirin for now.  follow up with Dr. Plummer

## 2018-07-31 NOTE — DISCHARGE NOTE ADULT - PLAN OF CARE
med management. continue home meds.  start anticoagulation as prescribed (Sent to your pharmacy) resume home meds, hold aspirin for now.  follow up with Dr. Plummer

## 2018-07-31 NOTE — DISCHARGE NOTE ADULT - PATIENT PORTAL LINK FT
You can access the [facility] Patient Portal, offered by [facility], by registering with the following website: [email or web address]

## 2018-07-31 NOTE — CONSULT NOTE ADULT - SUBJECTIVE AND OBJECTIVE BOX
CC: 63y old  Female who presents with a chief complaint of expressive aphasia    HPI:  64 year olf female with PMH of Afib on sotalol and  mg daily presented to ED AT 7.56 am with c/o dysarthria/expressive aphasia that began at approximately 0515 today while she was at work. Pt was at work doing night shift (works as a nurse), she had a HA, she checked her BP it was high, at 0515 while talking to a colleague she was unable to verbalize, had garbled speech (was noted by her nurse manager), her speech reverted back to normal within 15 minutes, she has right facial droop, she rechecked her BP it was high, hence came to ED.     In ED, pt had a stat CT head - negative, 's, she has no c/o HA, apahsia, dysarthria, visual blurring, weakness, numbness/tingling, ataxia, chest pain or SOB.   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  Situational anxiety  Thyroid cyst: drained  Diabetes insipidus  Chronic low back pain without sciatica, unspecified back pain laterality  Malignant neoplasm of left breast in female, estrogen receptor positive, unspecified site of breast  Varicose vein of leg: right lower extremity surgery  Palpitation  Hyperlipidemia, unspecified hyperlipidemia type  Essential hypertension  Chronic atrial fibrillation  H/O varicose veins of lower extremity: right .2015      FAMILY HISTORY:  No pertinent family history in first degree relatives      Social Hx:  Nonsmoker, no drug or alcohol use    MEDICATIONS  (STANDING):  Home Medications:   * Patient Currently Takes Medications as of 03-Dec-2017 20:42 documented in Structured Notes  · 	Norvasc 5 mg oral tablet: 1 tab(s) orally once a day   · 	oxyCODONE 5 mg oral tablet: 1 -2 tab(s) orally every 4 hours, As Needed -for moderate pain MDD:12  · 	sotalol 120 mg oral tablet: 1 tab(s) orally 2 times a day  · 	desmopressin 0.1 mg oral tablet: 1 tab(s) orally 2 times a d     Allergies  No Known Allergies    Intolerances    ROS: Pertinent positives in HPI, all other ROS were reviewed and are negative.      Vital Signs Last 24 Hrs  T(C): 36.8 (30 Jul 2018 08:53), Max: 36.8 (30 Jul 2018 08:53)  T(F): 98.3 (30 Jul 2018 08:53), Max: 98.3 (30 Jul 2018 08:53)  HR: 62 (30 Jul 2018 08:53) (62 - 63)  BP: 155/68 (30 Jul 2018 08:53) (155/68 - 176/94)  BP(mean): --  RR: 17 (30 Jul 2018 08:53) (17 - 17)  SpO2: 99% (30 Jul 2018 08:53) (99% - 99%)    GE:  Constitutional: awake and alert.  HEENT: PERRLA, EOMI,   Neck: Supple.  Respiratory: Breath sounds are clear bilaterally  Cardiovascular: S1 and S2, irregular rhythm  Gastrointestinal: soft, nontender  Extremities:  no edema  Vascular: Caritid Bruit - no  Musculoskeletal: Right ankle with bandage/ fascitis; no abnormal movements  Skin: No rashes    Neurological exam:  HF: A x O x 3. normal affect. Speech fluent, No Aphasia or paraphasic errors. Naming /repetition intact   CN: BJ, EOMI, VFF, facial sensation normal, right NLFD, tongue midline, Palate moves equally, SCM equal bilaterally  Motor: No pronator drift, Strength 5/5 in all 4 ext, normal bulk and tone, no tremor, rigidity.    Sens: Intact to light touch / PP   Reflexes: BJ 2+, BR 2+, KJ 2+, AJ 0, downgoing toes b/l  Coord:  No FNFA, dysmetria, ZACHARY intact   Gait/Balance: Not tested    NIHSS: 1    Labs:   07-30    142  |  107  |  16  ----------------------------<  101<H>  4.1   |  29  |  0.91    Ca    9.1      30 Jul 2018 08:20    TPro  7.9  /  Alb  3.6  /  TBili  0.4  /  DBili  x   /  AST  15  /  ALT  24  /  AlkPhos  111  07-30                        14.2   12.73 )-----------( 151      ( 30 Jul 2018 08:20 )             43.3       Radiology:  - CT Head: < from: CT Head No Cont (12.03.17 @ 19:56) >  Minimal white matter microvascular ischemic changes.  No acute intracranial hemorrhage or mass effect.    < end of copied text >
63 y.o. female with PMH AFib on ASA and sotolol, back pain, diabetes insipidus, HTN, HLD, L breast ca s/p lumpectomy presents with expressive aphasia, garbled speech. Pt was working the night shift with symptoms starting around 0515 this morning. She comprehends but was unable to say it. Pt came to ED for further eval. NIHSS 1 in the ED. Currently, pt's symptoms resolved. +HA. Pt denies fever, chills, CP, SOB, abd pain, dysuria. Pt reports chronic right sided facial droop and reports symptoms since age 5. Pt states she was very sick at that time and her facial bones/skull were deformed. She had neck surgery to drain fluid around that time as well. Pt reports CT head findings likely unchanged from age 5.  On ROS, pt reports occasional acute elevated blood pressure. Reports that she takes norvasc during those occasions. States she uses norvasc once every 2-3 months and during those 2-3 months, BP normal.  has been compliant with the sotalol and aspirin 325 mg daily.           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  Situational anxiety  Thyroid cyst: drained  Diabetes insipidus  Chronic low back pain without sciatica, unspecified back pain laterality  Malignant neoplasm of left breast in female, estrogen receptor positive, unspecified site of breast  Varicose vein of leg: right lower extremity surgery  Palpitation  Hyperlipidemia, unspecified hyperlipidemia type  Essential hypertension  Paroxysmal atrial fibrillation  H/O varicose veins of lower extremity: right .2015  L breast ca s/p lumpectomy  back cyst removal    MEDICATIONS  (STANDING):  anastrozole 1 milliGRAM(s) Oral daily  aspirin enteric coated 325 milliGRAM(s) Oral daily  atorvastatin 40 milliGRAM(s) Oral at bedtime  desmopressin 0.1 milliGRAM(s) Oral <User Schedule>  sotalol 120 milliGRAM(s) Oral two times a day    MEDICATIONS  (PRN):    Allergies    No Known Allergies    Intolerances      FAMILY HISTORY:  Father-stroke age 60; Mother-DM diet controlled        REVIEW OF SYSTEMS:    CONSTITUTIONAL: No weakness, fevers or chills  EYES/ENT: No visual changes;  No vertigo or throat pain   NECK: No pain or stiffness  RESPIRATORY: No cough, wheezing, hemoptysis; No shortness of breath  CARDIOVASCULAR: No chest pain or palpitations  GASTROINTESTINAL: No abdominal or epigastric pain. No nausea, vomiting, or hematemesis; No diarrhea or constipation. No melena or hematochezia.  GENITOURINARY: No dysuria, frequency or hematuria  NEUROLOGICAL: No numbness or weakness  SKIN: No itching, burning, rashes, or lesions   All other review of systems is negative unless indicated above      PHYSICAL EXAM:  Daily     Daily   Vital Signs Last 24 Hrs  T(C): 36.8 (31 Jul 2018 04:49), Max: 37.1 (30 Jul 2018 15:47)  T(F): 98.2 (31 Jul 2018 04:49), Max: 98.7 (30 Jul 2018 15:47)  HR: 63 (31 Jul 2018 04:49) (60 - 74)  BP: 154/68 (31 Jul 2018 04:49) (141/79 - 169/71)  BP(mean): --  RR: 18 (30 Jul 2018 20:16) (16 - 24)  SpO2: 95% (31 Jul 2018 04:49) (95% - 100%)    Constitutional: NAD, awake and alert, well-developed  HEENT: PERR, EOMI, Normal Hearing, MMM  Neck: Soft and supple, No LAD, No JVD  Respiratory: Breath sounds are clear bilaterally, No wheezing, rales or rhonchi  Cardiovascular: S1 and S2, regular rate and rhythm, no Murmurs, gallops or rubs  Gastrointestinal: Bowel Sounds present, soft, nontender, nondistended, no guarding, no rebound  Extremities: No peripheral edema  Vascular: 2+ peripheral pulses  Neurological: A/O x 3, no focal deficits  Musculoskeletal: 5/5 strength b/l upper and lower extremities  Skin: No rashes    LABS: All Labs Reviewed:                        14.3   9.98  )-----------( 149      ( 31 Jul 2018 06:34 )             44.0     07-31    142  |  108  |  16  ----------------------------<  102<H>  3.9   |  27  |  0.72    Ca    8.7      31 Jul 2018 06:34    TPro  7.9  /  Alb  3.6  /  TBili  0.4  /  DBili  x   /  AST  15  /  ALT  24  /  AlkPhos  111  07-30      CARDIAC MARKERS ( 30 Jul 2018 16:00 )  <0.015 ng/mL / x     / x     / x     / x      CARDIAC MARKERS ( 30 Jul 2018 10:54 )  <0.015 ng/mL / x     / x     / x     / x      CARDIAC MARKERS ( 30 Jul 2018 08:20 )  <0.015 ng/mL / x     / x     / x     / x          RADIOLOGY: < from: MR Angio Neck No Cont (07.30.18 @ 15:23) >  EXAM:  MR ANGIO NECK                            PROCEDURE DATE:  07/30/2018          INTERPRETATION:  MR angiography of the carotid circulation.  (Non   gadolinium technique.)      CLINICAL INFORMATION:      TIA, Stroke.            TECHNIQUE:  Two dimensional time of flight MR angiography of the carotid   circulation was performed.  This data set was reconstructed as maximum   intensity pixel images displayed in multiple rotations.  An additional   three dimensional time of flight acquisition was obtained with anatomic   coverage limited to the carotid bulbs.       The magnitude of stenosis   was evaluated based on the diameter of an intact distal of the internal   carotid artery using NASCET criteria.    FINDINGS:   No prior similar studies are available for review.    The right carotid system demonstrates an intact common carotid artery.    The carotid bulb is intact without hemodynamically significant stenosis.    The internal carotid artery is intact to the level the skull base.  The   origin and initial segment of the right external carotid artery also   appears intact.    The left carotid system demonstrates an intact common carotid artery.    The carotid bulb is intact without hemodynamically significant stenosis.    The internal carotid artery is intact to the level the skull base.  The   origin and initial segment of the left external carotid artery also   appears intact.    The vertebral arteries demonstrate antegrade flow.  The degree of   vertebral asymmetry is within physiologic variation.    IMPRESSION:      1.  Right carotid system:   No hemodynamically significant stenosis.          2.  Left carotid system:   No hemodynamically significant stenosis.          < end of copied text >    < from: MR Angio Head No Cont (07.30.18 @ 15:23) >  EXAM:  MR ANGIO BRAIN                            PROCEDURE DATE:  07/30/2018          INTERPRETATION:      MR angiography of the intracranial circulation.  (Non gadolinium   technique.)      CLINICAL INFORMATION:  stroke; vascular stenosis.    TECHNIQUE:  MR angiography of intracranial circulation was performed   using three-dimensional time of flight technique. Post processing   angiographic reconstruction of images was performed. This data set was   reconstructed as maximum intensity pixel images and displayed in multiple   rotations.    FINDINGS:   No comparison similar studies are available.    The anterior circulation demonstrates intact petrous, cavernous and   clinoid segments of the internal carotid arteries.  The anterior cerebral   arteries are intact and symmetric. An anterior communicating artery is   demonstrated.  The middle cerebral arteries demonstrate intact initial M1   and M2 segments. There are symmetric intact peripheral Sylvian branches.    The posterior circulation demonstrates intact vertebral, basilar and   posterior cerebral arteries.  There is a fetal origin to the LEFT   posterior cerebral arteries.    No abnormal vessel termination, vascular malformation or intracranial   aneurysm is recognized.      IMPRESSION:   Unremarkable MR angiography of the intracranial   circulation.    Anatomic variations as described.        < end of copied text >    < from: MR Head No Cont (07.30.18 @ 15:23) >  EXAM:  MR BRAIN                            PROCEDURE DATE:  07/30/2018          INTERPRETATION:    MR brain  without gadolinium     CLINICAL INFORMATION:   Unable to speak for half an hour today, resolved      TECHNIQUE:   Sagittal and axial T1-weighted images, axial FLAIR images,   axial gradient echo and T2-weighted images and axial diffusion weighted   images of the brain were obtained.         FINDINGS:   CT dated 7/30/2018 is available for review.         The brain demonstrates old hemorrhagic lacunar infarction within the   inferior LEFT basal ganglia.   No acute cerebral cortical infarct is   found.   No intracranial hemorrhage is recognized. No mass effect is   found in the brain.        The ventricles, sulci and basal cisterns appear unremarkable.    The vertebral and internal carotid arteries demonstrate expected flow   voids indicating their patency.         The orbits are unremarkable.  The paranasal sinuses are clear.  The nasal   cavity appears intact.  The nasopharynx is symmetric.  The central skull   base and temporal bones are intact.  The calvarium shows areas of   BILATERAL calvarial thinning of uncertain etiology, possibly related to   prior procedures or a congenital variant.    IMPRESSION:   old hemorrhagic lacunar infarction within the inferior LEFT   basal ganglia.     Areas of BILATERAL calvarial thinning of uncertain   etiology, possibly related to prior procedures or a congenital variant.    < end of copied text >      EKG: baseline EKG shows NSR.  Telemetry showing paroxysmal Aflutter    CARDIOLOGY TESTING:  < from: Transthoracic Echocardiogram (07.30.18 @ 14:09) >   EXAM:  2D ECHOCARDIOGRAM AD         PROCEDURE DATE:  07/30/2018        INTERPRETATION:  Transthoracic Echocardiography Report (TTE)     Demographics     Patient name        UGS HORTON   Age           63 year(s)     Med Rec #           [id number]         Gender        Female     Account #           [id number]           Date of Birth 1955     Interpreting        James Orantes     Room Number   0342   Physician     Referring Physician OLAYINKA OLIVER   Sonographer   Alena Pearl MD                              [facility]     Date of study       07/30/2018 01:56                       PM     Height              63.78 in          Weight        149.92 pounds    Type of Study:     TTE procedure: 2D echocardiogram AD     BP: 190/80 mmHg     [facility]    Indications   1) G45.9 - Transient cerebral ischemic attack    M-Mode Measurements (cm)     LVEDd: 4.71 cm              LVESd: 2.82 cm   IVSEd: 0.9 cm   LVPWd: 1.1 cm               AO Root Dimension: 3 cm                               ACS: 1.9 cm                               LA: 4.8 cm    Doppler Measurements:      MV Peak E-Wave: 91.3 cm/s    MV Peak A-Wave: 58.2 cm/s    MV E/A Ratio: 1.57 %    MV Peak Gradient: 3.33 mmHg     Findings     Mitral Valve   Normal appearing mitral valve structure and function.   Mild to moderate mitral regurgitation is present.   Mild mitral annular calcification is present.     Aortic Valve   Normal aortic valve structure and function.   Mild (1+) aortic regurgitation is present.     Tricuspid Valve   Normal appearing tricuspid valve structure and function.   Trace tricuspid valve regurgitation is present.     Pulmonic Valve   Normal appearing pulmonic valve structure and function.   Trace pulmonic valvular regurgitation is present.     Left Atrium   The left atrium is mildly dilated.     Left Ventricle   The left ventricle is normal in size, wall thickness, wall motion and   contractility.   Estimated left ventricular ejection fraction is 60-65 %.     Right Atrium   Normal appearing right atrium.     Right Ventricle   Normal appearing right ventricle structure and function.     Pericardial Effusion   No evidence of pericardial effusion.     Pleural Effusion   No evidence of pleural effusion.     Miscellaneous   All visualized extra cardiac structures appears to be normal.     Impression     Summary     The left ventricle is normal in size, wall thickness, wall motion and   contractility.   Estimated left ventricular ejection fraction is 60-65 %.   The left atrium is mildly dilated.   Normal aortic valve structure and function.   Mild (1+) aortic regurgitation is present.   Normal appearing mitral valve structure and function.   Mild to moderate mitral regurgitation is present.   Mild mitral annular calcification is present.    < end of copied text >

## 2018-07-31 NOTE — PROGRESS NOTE ADULT - ASSESSMENT
64 year olf female with PMH of Afib on sotalol and  mg daily presented to ED AT 7.56 am with an episode of dysarthria/expressive aphasia that began at approximately 0515 today resolved within 15 minutes, Pt has right facial droop, SBP high. In ED, CT head - negative. NIHSS - 1     # TIA; No IV tpa given because resolution of sx, NIHSS -1 (which may be old)  MRI brain reveals old left BG infarct, no acute infarcts seen  MRA' s no sign stenosis    # H/O chronic A.fib - Telemetry monitoring showing paroxysmal aflutter/afib.      - Agrre with starting Xarelto  - Atorvastatin  - f/u with Dr. Plummer    Management d/w Pt AND Dr. Plummer

## 2018-07-31 NOTE — PROGRESS NOTE ADULT - SUBJECTIVE AND OBJECTIVE BOX
S&0:  Pt has no episode of speech disturbance, No HA    MRI brain reveals old left BG infarct, no acute infarcts seen  MRA' s no sign stenosis    Telemetry monitoring showing paroxysmal aflutter/afib.    ROS: All ROS -Negative    MEDICATIONS  (STANDING):  anastrozole 1 milliGRAM(s) Oral daily  aspirin enteric coated 81 milliGRAM(s) Oral daily  atorvastatin 40 milliGRAM(s) Oral at bedtime  desmopressin 0.1 milliGRAM(s) Oral <User Schedule>  rivaroxaban 20 milliGRAM(s) Oral every 24 hours  sotalol 120 milliGRAM(s) Oral two times a day      Vital Signs Last 24 Hrs  T(C): 36.8 (31 Jul 2018 04:49), Max: 37.1 (30 Jul 2018 15:47)  T(F): 98.2 (31 Jul 2018 04:49), Max: 98.7 (30 Jul 2018 15:47)  HR: 63 (31 Jul 2018 04:49) (60 - 74)  BP: 154/68 (31 Jul 2018 04:49) (141/79 - 169/71)  BP(mean): --  RR: 18 (30 Jul 2018 20:16) (16 - 24)  SpO2: 95% (31 Jul 2018 04:49) (95% - 100%)    Neurological exam:  HF: A x O x 3. normal affect. Speech fluent, No Aphasia or paraphasic errors. Naming /repetition intact   CN: BJ, EOMI, VFF, facial sensation normal, right NLFD, tongue midline, Palate moves equally, SCM equal bilaterally  Motor: No pronator drift, Strength 5/5 in all 4 ext, normal bulk and tone, no tremor, rigidity.    Sens: Intact to light touch / PP   Reflexes: BJ 2+, BR 2+, KJ 2+, AJ 0, downgoing toes b/l  Coord:  No FNFA, dysmetria, ZACHARY intact   Gait/Balance: Not tested    NIHSS: 1                        14.3   9.98  )-----------( 149      ( 31 Jul 2018 06:34 )             44.0     07-31    142  |  108  |  16  ----------------------------<  102<H>  3.9   |  27  |  0.72    Ca    8.7      31 Jul 2018 06:34    TPro  7.9  /  Alb  3.6  /  TBili  0.4  /  DBili  x   /  AST  15  /  ALT  24  /  AlkPhos  111  07-30    07-31 [id number] 5.8

## 2018-07-31 NOTE — DISCHARGE NOTE ADULT - CARE PROVIDER_API CALL
Leslie Plummer), Cardiovascular Disease; Internal Medicine  [address] [place] [address]  Phone: [phone]  Fax: [phone]

## 2018-07-31 NOTE — CONSULT NOTE ADULT - ASSESSMENT
63 y.o. female with PMH AFib on ASA and sotolol, back pain, diabetes insipidus, HTN, HLD, L breast ca s/p lumpectomy presents with expressive aphasia, garbled speech. Telemetry monitoring showing paroxysmal aflutter/afib.    7/31:  start Xarelto 20 mg daily   baby aspirin 81 mg daily.   continue medications for blood pressure.   continue Sotalol.   May be discharged and should follow up with me next week as an outpatient. Will discuss ablation at that time
64 year olf female with PMH of Afib on sotalol and  mg daily presented to ED AT 7.56 am with an episode of dysarthria/expressive aphasia that began at approximately 0515 today resolved within 15 minutes, Pt has right facial droop, SBP high. In ED, CT head - negative. NIHSS - 1     # TIA; No IV tpa given because resolution of sx, NIHSS -1 (which may be old)    # H/O chronic A.fib - rule out embolic infracts.    - MRI brain  - MRA brain/Carotids  - ASA - Sitalol, if embolic infarcts - consider AC  - Atorvastatin  - DVT prophylaxis  - DYSPHAGIA screen, Speech and swallow eval  - PT valeri  - Echo  - BP control    Management d/w Pt AND Dr. Diaz

## 2018-07-31 NOTE — PHYSICAL THERAPY INITIAL EVALUATION ADULT - PERTINENT HX OF CURRENT PROBLEM, REHAB EVAL
expressive aphasia, garbled speech, elev BP. (chronic right sided facial droop) CTH (-) ac. infarct, + defects of calvarium possibly prior kevyn holes vs metastatic dz, MRI shows old infarct L BG and areas of thinning B calvaria due to past procedure or congenital variant. MRA (-). expressive aphasia, garbled speech, elev BP. (Pt has chronic right sided facial droop since childhood) CTH (-) ac. infarct, + defects of calvarium possibly prior kevyn holes vs metastatic dz, MRI shows old infarct L BG and areas of thinning B calvaria due to past procedure or congenital variant. MRA (-).

## 2018-08-06 DIAGNOSIS — G45.9 TRANSIENT CEREBRAL ISCHEMIC ATTACK, UNSPECIFIED: ICD-10-CM

## 2018-08-06 DIAGNOSIS — I48.0 PAROXYSMAL ATRIAL FIBRILLATION: ICD-10-CM

## 2018-08-06 DIAGNOSIS — R47.01 APHASIA: ICD-10-CM

## 2018-08-06 DIAGNOSIS — E78.5 HYPERLIPIDEMIA, UNSPECIFIED: ICD-10-CM

## 2018-08-06 DIAGNOSIS — C50.912 MALIGNANT NEOPLASM OF UNSPECIFIED SITE OF LEFT FEMALE BREAST: ICD-10-CM

## 2018-08-06 DIAGNOSIS — I48.2 CHRONIC ATRIAL FIBRILLATION: ICD-10-CM

## 2018-08-06 DIAGNOSIS — E23.2 DIABETES INSIPIDUS: ICD-10-CM

## 2018-08-06 DIAGNOSIS — M54.5 LOW BACK PAIN: ICD-10-CM

## 2018-08-06 DIAGNOSIS — G89.29 OTHER CHRONIC PAIN: ICD-10-CM

## 2018-08-06 DIAGNOSIS — I10 ESSENTIAL (PRIMARY) HYPERTENSION: ICD-10-CM

## 2018-08-06 DIAGNOSIS — I48.91 UNSPECIFIED ATRIAL FIBRILLATION: ICD-10-CM

## 2018-08-15 ENCOUNTER — MEDICATION RENEWAL (OUTPATIENT)
Age: 63
End: 2018-08-15

## 2018-09-07 ENCOUNTER — LABORATORY RESULT (OUTPATIENT)
Age: 63
End: 2018-09-07

## 2018-09-07 ENCOUNTER — APPOINTMENT (OUTPATIENT)
Dept: HEMATOLOGY ONCOLOGY | Facility: CLINIC | Age: 63
End: 2018-09-07
Payer: COMMERCIAL

## 2018-09-07 VITALS
HEART RATE: 59 BPM | DIASTOLIC BLOOD PRESSURE: 72 MMHG | BODY MASS INDEX: 35.68 KG/M2 | HEIGHT: 61 IN | TEMPERATURE: 99.4 F | WEIGHT: 189 LBS | SYSTOLIC BLOOD PRESSURE: 142 MMHG

## 2018-09-07 DIAGNOSIS — Z86.73 PERSONAL HISTORY OF TRANSIENT ISCHEMIC ATTACK (TIA), AND CEREBRAL INFARCTION W/OUT RESIDUAL DEFICITS: ICD-10-CM

## 2018-09-07 DIAGNOSIS — Z51.81 ENCOUNTER FOR THERAPEUTIC DRUG LVL MONITORING: ICD-10-CM

## 2018-09-07 DIAGNOSIS — Z17.0 MALIGNANT NEOPLASM OF UPPER-INNER QUADRANT OF LEFT FEMALE BREAST: ICD-10-CM

## 2018-09-07 DIAGNOSIS — Z79.899 ENCOUNTER FOR THERAPEUTIC DRUG LVL MONITORING: ICD-10-CM

## 2018-09-07 DIAGNOSIS — C50.212 MALIGNANT NEOPLASM OF UPPER-INNER QUADRANT OF LEFT FEMALE BREAST: ICD-10-CM

## 2018-09-07 DIAGNOSIS — I10 ESSENTIAL (PRIMARY) HYPERTENSION: ICD-10-CM

## 2018-09-07 LAB
HCT VFR BLD CALC: 45.9 %
HGB BLD-MCNC: 15.1 G/DL
MCHC RBC-ENTMCNC: 28.5 PG
MCHC RBC-ENTMCNC: 33 GM/DL
MCV RBC AUTO: 86.5 FL
PLATELET # BLD AUTO: 178 K/UL
RBC # BLD: 5.31 M/UL
RBC # FLD: 12.1 %
WBC # FLD AUTO: 9.8 K/UL

## 2018-09-07 PROCEDURE — 99214 OFFICE O/P EST MOD 30 MIN: CPT | Mod: 25

## 2018-09-07 PROCEDURE — 36415 COLL VENOUS BLD VENIPUNCTURE: CPT

## 2018-09-07 PROCEDURE — 85025 COMPLETE CBC W/AUTO DIFF WBC: CPT

## 2018-09-07 RX ORDER — CHROMIUM 200 MCG
1000 TABLET ORAL
Refills: 0 | Status: ACTIVE | COMMUNITY

## 2018-09-07 RX ORDER — ATORVASTATIN CALCIUM 80 MG/1
TABLET, FILM COATED ORAL
Refills: 0 | Status: ACTIVE | COMMUNITY

## 2018-09-07 RX ORDER — RIVAROXABAN 20 MG/1
20 TABLET, FILM COATED ORAL
Refills: 0 | Status: ACTIVE | COMMUNITY

## 2018-09-17 LAB
25(OH)D3 SERPL-MCNC: 33.4 NG/ML
ALBUMIN SERPL ELPH-MCNC: 4.2 G/DL
ALP BLD-CCNC: 106 U/L
ALT SERPL-CCNC: 13 U/L
ANION GAP SERPL CALC-SCNC: 14 MMOL/L
AST SERPL-CCNC: 17 U/L
BILIRUB SERPL-MCNC: 0.5 MG/DL
BUN SERPL-MCNC: 21 MG/DL
CALCIUM SERPL-MCNC: 9.3 MG/DL
CHLORIDE SERPL-SCNC: 104 MMOL/L
CO2 SERPL-SCNC: 24 MMOL/L
CREAT SERPL-MCNC: 1.21 MG/DL
GLUCOSE SERPL-MCNC: 97 MG/DL
POTASSIUM SERPL-SCNC: 4.8 MMOL/L
PROT SERPL-MCNC: 7.3 G/DL
SODIUM SERPL-SCNC: 142 MMOL/L

## 2018-09-19 ENCOUNTER — FORM ENCOUNTER (OUTPATIENT)
Age: 63
End: 2018-09-19

## 2018-09-20 ENCOUNTER — APPOINTMENT (OUTPATIENT)
Dept: ULTRASOUND IMAGING | Facility: CLINIC | Age: 63
End: 2018-09-20
Payer: COMMERCIAL

## 2018-09-20 ENCOUNTER — APPOINTMENT (OUTPATIENT)
Dept: MAMMOGRAPHY | Facility: CLINIC | Age: 63
End: 2018-09-20
Payer: COMMERCIAL

## 2018-09-20 ENCOUNTER — APPOINTMENT (OUTPATIENT)
Dept: RADIOLOGY | Facility: CLINIC | Age: 63
End: 2018-09-20
Payer: COMMERCIAL

## 2018-09-20 ENCOUNTER — OUTPATIENT (OUTPATIENT)
Dept: OUTPATIENT SERVICES | Facility: HOSPITAL | Age: 63
LOS: 1 days | End: 2018-09-20
Payer: COMMERCIAL

## 2018-09-20 DIAGNOSIS — Z86.79 PERSONAL HISTORY OF OTHER DISEASES OF THE CIRCULATORY SYSTEM: Chronic | ICD-10-CM

## 2018-09-20 DIAGNOSIS — Z00.8 ENCOUNTER FOR OTHER GENERAL EXAMINATION: ICD-10-CM

## 2018-09-20 PROCEDURE — 76641 ULTRASOUND BREAST COMPLETE: CPT | Mod: 26,50

## 2018-09-20 PROCEDURE — 77080 DXA BONE DENSITY AXIAL: CPT | Mod: 26

## 2018-09-20 PROCEDURE — G0279: CPT

## 2018-09-20 PROCEDURE — 77080 DXA BONE DENSITY AXIAL: CPT

## 2018-09-20 PROCEDURE — 77066 DX MAMMO INCL CAD BI: CPT

## 2018-09-20 PROCEDURE — G0279: CPT | Mod: 26

## 2018-09-20 PROCEDURE — 77066 DX MAMMO INCL CAD BI: CPT | Mod: 26

## 2018-09-20 PROCEDURE — 76641 ULTRASOUND BREAST COMPLETE: CPT

## 2018-09-30 ENCOUNTER — FORM ENCOUNTER (OUTPATIENT)
Age: 63
End: 2018-09-30

## 2018-10-01 ENCOUNTER — APPOINTMENT (OUTPATIENT)
Dept: ULTRASOUND IMAGING | Facility: CLINIC | Age: 63
End: 2018-10-01
Payer: COMMERCIAL

## 2018-10-01 ENCOUNTER — APPOINTMENT (OUTPATIENT)
Dept: MAMMOGRAPHY | Facility: CLINIC | Age: 63
End: 2018-10-01
Payer: COMMERCIAL

## 2018-10-01 ENCOUNTER — RESULT REVIEW (OUTPATIENT)
Age: 63
End: 2018-10-01

## 2018-10-01 ENCOUNTER — OUTPATIENT (OUTPATIENT)
Dept: OUTPATIENT SERVICES | Facility: HOSPITAL | Age: 63
LOS: 1 days | End: 2018-10-01
Payer: COMMERCIAL

## 2018-10-01 DIAGNOSIS — Z86.79 PERSONAL HISTORY OF OTHER DISEASES OF THE CIRCULATORY SYSTEM: Chronic | ICD-10-CM

## 2018-10-01 DIAGNOSIS — Z00.8 ENCOUNTER FOR OTHER GENERAL EXAMINATION: ICD-10-CM

## 2018-10-01 PROCEDURE — 19083 BX BREAST 1ST LESION US IMAG: CPT

## 2018-10-01 PROCEDURE — 19081 BX BREAST 1ST LESION STRTCTC: CPT | Mod: RT,59

## 2018-10-01 PROCEDURE — 19081 BX BREAST 1ST LESION STRTCTC: CPT

## 2018-10-01 PROCEDURE — 19084 BX BREAST ADD LESION US IMAG: CPT | Mod: LT

## 2018-10-01 PROCEDURE — A4648: CPT

## 2018-10-01 PROCEDURE — 77066 DX MAMMO INCL CAD BI: CPT | Mod: 26

## 2018-10-01 PROCEDURE — 77066 DX MAMMO INCL CAD BI: CPT

## 2018-10-01 PROCEDURE — 19083 BX BREAST 1ST LESION US IMAG: CPT | Mod: RT,59

## 2018-10-11 ENCOUNTER — APPOINTMENT (OUTPATIENT)
Dept: BREAST CENTER | Facility: CLINIC | Age: 63
End: 2018-10-11
Payer: COMMERCIAL

## 2018-10-11 VITALS
DIASTOLIC BLOOD PRESSURE: 70 MMHG | HEIGHT: 61 IN | SYSTOLIC BLOOD PRESSURE: 140 MMHG | WEIGHT: 190 LBS | BODY MASS INDEX: 35.87 KG/M2

## 2018-10-11 DIAGNOSIS — R92.0 MAMMOGRAPHIC MICROCALCIFICATION FOUND ON DIAGNOSTIC IMAGING OF BREAST: ICD-10-CM

## 2018-10-11 PROCEDURE — 99215 OFFICE O/P EST HI 40 MIN: CPT

## 2018-10-14 ENCOUNTER — FORM ENCOUNTER (OUTPATIENT)
Age: 63
End: 2018-10-14

## 2018-10-15 ENCOUNTER — APPOINTMENT (OUTPATIENT)
Dept: MRI IMAGING | Facility: CLINIC | Age: 63
End: 2018-10-15
Payer: COMMERCIAL

## 2018-10-15 ENCOUNTER — OUTPATIENT (OUTPATIENT)
Dept: OUTPATIENT SERVICES | Facility: HOSPITAL | Age: 63
LOS: 1 days | End: 2018-10-15
Payer: COMMERCIAL

## 2018-10-15 DIAGNOSIS — Z00.8 ENCOUNTER FOR OTHER GENERAL EXAMINATION: ICD-10-CM

## 2018-10-15 DIAGNOSIS — Z86.79 PERSONAL HISTORY OF OTHER DISEASES OF THE CIRCULATORY SYSTEM: Chronic | ICD-10-CM

## 2018-10-15 PROCEDURE — 77059 MRI BREAST BILATERAL: CPT | Mod: 26

## 2018-10-15 PROCEDURE — A9585: CPT

## 2018-10-15 PROCEDURE — 0159T: CPT | Mod: 26

## 2018-10-15 PROCEDURE — C8937: CPT

## 2018-10-15 PROCEDURE — C8908: CPT

## 2018-10-28 ENCOUNTER — FORM ENCOUNTER (OUTPATIENT)
Age: 63
End: 2018-10-28

## 2018-10-29 ENCOUNTER — APPOINTMENT (OUTPATIENT)
Dept: MAMMOGRAPHY | Facility: CLINIC | Age: 63
End: 2018-10-29
Payer: COMMERCIAL

## 2018-10-29 ENCOUNTER — OUTPATIENT (OUTPATIENT)
Dept: OUTPATIENT SERVICES | Facility: HOSPITAL | Age: 63
LOS: 1 days | End: 2018-10-29
Payer: COMMERCIAL

## 2018-10-29 ENCOUNTER — RESULT REVIEW (OUTPATIENT)
Age: 63
End: 2018-10-29

## 2018-10-29 DIAGNOSIS — Z00.8 ENCOUNTER FOR OTHER GENERAL EXAMINATION: ICD-10-CM

## 2018-10-29 DIAGNOSIS — Z86.79 PERSONAL HISTORY OF OTHER DISEASES OF THE CIRCULATORY SYSTEM: Chronic | ICD-10-CM

## 2018-10-29 PROCEDURE — 77065 DX MAMMO INCL CAD UNI: CPT

## 2018-10-29 PROCEDURE — 77065 DX MAMMO INCL CAD UNI: CPT | Mod: 26,RT

## 2018-10-29 PROCEDURE — 19081 BX BREAST 1ST LESION STRTCTC: CPT

## 2018-10-29 PROCEDURE — A4648: CPT

## 2018-10-29 PROCEDURE — 19081 BX BREAST 1ST LESION STRTCTC: CPT | Mod: RT

## 2018-10-31 ENCOUNTER — MESSAGE (OUTPATIENT)
Age: 63
End: 2018-10-31

## 2018-11-06 ENCOUNTER — OUTPATIENT (OUTPATIENT)
Dept: OUTPATIENT SERVICES | Facility: HOSPITAL | Age: 63
LOS: 1 days | Discharge: HOME | End: 2018-11-06
Payer: COMMERCIAL

## 2018-11-06 DIAGNOSIS — Z86.79 PERSONAL HISTORY OF OTHER DISEASES OF THE CIRCULATORY SYSTEM: Chronic | ICD-10-CM

## 2018-11-06 DIAGNOSIS — Z98.890 OTHER SPECIFIED POSTPROCEDURAL STATES: Chronic | ICD-10-CM

## 2018-11-06 DIAGNOSIS — D05.01 LOBULAR CARCINOMA IN SITU OF RIGHT BREAST: ICD-10-CM

## 2018-11-06 LAB
ALBUMIN SERPL ELPH-MCNC: 3.4 G/DL — SIGNIFICANT CHANGE UP (ref 3.3–5)
ALP SERPL-CCNC: 103 U/L — SIGNIFICANT CHANGE UP (ref 40–120)
ALT FLD-CCNC: 18 U/L — SIGNIFICANT CHANGE UP (ref 12–78)
ANION GAP SERPL CALC-SCNC: 7 MMOL/L — SIGNIFICANT CHANGE UP (ref 5–17)
APTT BLD: 47.7 SEC — HIGH (ref 27.5–36.3)
AST SERPL-CCNC: 14 U/L — LOW (ref 15–37)
BASOPHILS # BLD AUTO: 0.05 K/UL — SIGNIFICANT CHANGE UP (ref 0–0.2)
BASOPHILS NFR BLD AUTO: 0.5 % — SIGNIFICANT CHANGE UP (ref 0–2)
BILIRUB SERPL-MCNC: 0.7 MG/DL — SIGNIFICANT CHANGE UP (ref 0.2–1.2)
BLD GP AB SCN SERPL QL: SIGNIFICANT CHANGE UP
BUN SERPL-MCNC: 19 MG/DL — SIGNIFICANT CHANGE UP (ref 7–23)
CALCIUM SERPL-MCNC: 8.8 MG/DL — SIGNIFICANT CHANGE UP (ref 8.5–10.1)
CHLORIDE SERPL-SCNC: 106 MMOL/L — SIGNIFICANT CHANGE UP (ref 96–108)
CO2 SERPL-SCNC: 26 MMOL/L — SIGNIFICANT CHANGE UP (ref 22–31)
CREAT SERPL-MCNC: 0.98 MG/DL — SIGNIFICANT CHANGE UP (ref 0.5–1.3)
EOSINOPHIL # BLD AUTO: 0.42 K/UL — SIGNIFICANT CHANGE UP (ref 0–0.5)
EOSINOPHIL NFR BLD AUTO: 4.6 % — SIGNIFICANT CHANGE UP (ref 0–6)
GLUCOSE SERPL-MCNC: 89 MG/DL — SIGNIFICANT CHANGE UP (ref 70–99)
HCT VFR BLD CALC: 38 % — SIGNIFICANT CHANGE UP (ref 34.5–45)
HGB BLD-MCNC: 12.3 G/DL — SIGNIFICANT CHANGE UP (ref 11.5–15.5)
IMM GRANULOCYTES NFR BLD AUTO: 0.1 % — SIGNIFICANT CHANGE UP (ref 0–1.5)
INR BLD: 1.74 RATIO — HIGH (ref 0.88–1.16)
LYMPHOCYTES # BLD AUTO: 3.51 K/UL — HIGH (ref 1–3.3)
LYMPHOCYTES # BLD AUTO: 38.1 % — SIGNIFICANT CHANGE UP (ref 13–44)
MCHC RBC-ENTMCNC: 29.2 PG — SIGNIFICANT CHANGE UP (ref 27–34)
MCHC RBC-ENTMCNC: 32.4 GM/DL — SIGNIFICANT CHANGE UP (ref 32–36)
MCV RBC AUTO: 90.3 FL — SIGNIFICANT CHANGE UP (ref 80–100)
MONOCYTES # BLD AUTO: 0.84 K/UL — SIGNIFICANT CHANGE UP (ref 0–0.9)
MONOCYTES NFR BLD AUTO: 9.1 % — SIGNIFICANT CHANGE UP (ref 2–14)
NEUTROPHILS # BLD AUTO: 4.39 K/UL — SIGNIFICANT CHANGE UP (ref 1.8–7.4)
NEUTROPHILS NFR BLD AUTO: 47.6 % — SIGNIFICANT CHANGE UP (ref 43–77)
NRBC # BLD: 0 /100 WBCS — SIGNIFICANT CHANGE UP (ref 0–0)
PLATELET # BLD AUTO: 141 K/UL — LOW (ref 150–400)
POTASSIUM SERPL-MCNC: 4.2 MMOL/L — SIGNIFICANT CHANGE UP (ref 3.5–5.3)
POTASSIUM SERPL-SCNC: 4.2 MMOL/L — SIGNIFICANT CHANGE UP (ref 3.5–5.3)
PROT SERPL-MCNC: 7.2 GM/DL — SIGNIFICANT CHANGE UP (ref 6–8.3)
PROTHROM AB SERPL-ACNC: 19.6 SEC — HIGH (ref 10–12.9)
RBC # BLD: 4.21 M/UL — SIGNIFICANT CHANGE UP (ref 3.8–5.2)
RBC # FLD: 12.9 % — SIGNIFICANT CHANGE UP (ref 10.3–14.5)
SODIUM SERPL-SCNC: 139 MMOL/L — SIGNIFICANT CHANGE UP (ref 135–145)
TYPE + AB SCN PNL BLD: SIGNIFICANT CHANGE UP
WBC # BLD: 9.22 K/UL — SIGNIFICANT CHANGE UP (ref 3.8–10.5)
WBC # FLD AUTO: 9.22 K/UL — SIGNIFICANT CHANGE UP (ref 3.8–10.5)

## 2018-11-06 PROCEDURE — 93010 ELECTROCARDIOGRAM REPORT: CPT

## 2018-11-06 PROCEDURE — 71046 X-RAY EXAM CHEST 2 VIEWS: CPT | Mod: 26

## 2018-11-06 RX ORDER — DESMOPRESSIN ACETATE 0.1 MG/1
1 TABLET ORAL
Qty: 0 | Refills: 0 | COMMUNITY

## 2018-11-06 NOTE — CHART NOTE - NSCHARTNOTEFT_GEN_A_CORE
Plan  1. Stop all NSAIDS, herbal supplements and vitamins for 7 days.  2. NPO at midnight.  3. Take the following medications ( sotalol, desmopressin ) with small sips of water on the morning of your procedure/surgery.  4. Use EZ sponges as directed  5. Pt to follow preop instructions on Xarelto and Aspirin from surgeon and PMD  6. Pt to see Dr. PHILIPPE Plummer for optimization prior to surgery.

## 2018-11-06 NOTE — ASU PATIENT PROFILE, ADULT - PMH
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

## 2018-11-07 ENCOUNTER — FORM ENCOUNTER (OUTPATIENT)
Age: 63
End: 2018-11-07

## 2018-11-07 PROBLEM — F41.8 OTHER SPECIFIED ANXIETY DISORDERS: Chronic | Status: INACTIVE | Noted: 2017-09-15 | Resolved: 2018-11-06

## 2018-11-07 PROBLEM — E04.1 NONTOXIC SINGLE THYROID NODULE: Chronic | Status: ACTIVE | Noted: 2017-09-15

## 2018-11-08 ENCOUNTER — OUTPATIENT (OUTPATIENT)
Dept: OUTPATIENT SERVICES | Facility: HOSPITAL | Age: 63
LOS: 1 days | End: 2018-11-08
Payer: COMMERCIAL

## 2018-11-08 ENCOUNTER — APPOINTMENT (OUTPATIENT)
Dept: MAMMOGRAPHY | Facility: CLINIC | Age: 63
End: 2018-11-08
Payer: COMMERCIAL

## 2018-11-08 DIAGNOSIS — Z98.890 OTHER SPECIFIED POSTPROCEDURAL STATES: Chronic | ICD-10-CM

## 2018-11-08 DIAGNOSIS — Z86.79 PERSONAL HISTORY OF OTHER DISEASES OF THE CIRCULATORY SYSTEM: Chronic | ICD-10-CM

## 2018-11-08 DIAGNOSIS — Z00.8 ENCOUNTER FOR OTHER GENERAL EXAMINATION: ICD-10-CM

## 2018-11-08 PROBLEM — D05.01 LOBULAR CARCINOMA IN SITU OF RIGHT BREAST: Chronic | Status: ACTIVE | Noted: 2018-11-06

## 2018-11-08 PROCEDURE — 19281 PERQ DEVICE BREAST 1ST IMAG: CPT | Mod: RT

## 2018-11-08 PROCEDURE — 19282 PERQ DEVICE BREAST EA IMAG: CPT

## 2018-11-08 PROCEDURE — 19282 PERQ DEVICE BREAST EA IMAG: CPT | Mod: RT

## 2018-11-08 PROCEDURE — 19281 PERQ DEVICE BREAST 1ST IMAG: CPT

## 2018-11-08 PROCEDURE — C1739: CPT

## 2018-11-14 ENCOUNTER — APPOINTMENT (OUTPATIENT)
Dept: BREAST CENTER | Facility: CLINIC | Age: 63
End: 2018-11-14
Payer: COMMERCIAL

## 2018-11-14 PROCEDURE — 99214 OFFICE O/P EST MOD 30 MIN: CPT

## 2018-11-14 RX ORDER — SODIUM CHLORIDE 9 MG/ML
3 INJECTION INTRAMUSCULAR; INTRAVENOUS; SUBCUTANEOUS EVERY 8 HOURS
Qty: 0 | Refills: 0 | Status: DISCONTINUED | OUTPATIENT
Start: 2018-11-15 | End: 2018-12-21

## 2018-11-15 ENCOUNTER — APPOINTMENT (OUTPATIENT)
Dept: BREAST CENTER | Facility: HOSPITAL | Age: 63
End: 2018-11-15

## 2018-11-15 ENCOUNTER — OUTPATIENT (OUTPATIENT)
Dept: OUTPATIENT SERVICES | Facility: HOSPITAL | Age: 63
LOS: 1 days | Discharge: HOME | End: 2018-11-15
Payer: COMMERCIAL

## 2018-11-15 VITALS
DIASTOLIC BLOOD PRESSURE: 80 MMHG | OXYGEN SATURATION: 99 % | RESPIRATION RATE: 14 BRPM | SYSTOLIC BLOOD PRESSURE: 138 MMHG | HEART RATE: 82 BPM

## 2018-11-15 VITALS
HEART RATE: 56 BPM | DIASTOLIC BLOOD PRESSURE: 61 MMHG | RESPIRATION RATE: 16 BRPM | OXYGEN SATURATION: 99 % | SYSTOLIC BLOOD PRESSURE: 131 MMHG | HEIGHT: 61 IN | WEIGHT: 184.97 LBS | TEMPERATURE: 98 F

## 2018-11-15 DIAGNOSIS — Z98.890 OTHER SPECIFIED POSTPROCEDURAL STATES: Chronic | ICD-10-CM

## 2018-11-15 DIAGNOSIS — Z86.79 PERSONAL HISTORY OF OTHER DISEASES OF THE CIRCULATORY SYSTEM: Chronic | ICD-10-CM

## 2018-11-15 PROCEDURE — 19126 EXCISION ADDL BREAST LESION: CPT

## 2018-11-15 PROCEDURE — 93010 ELECTROCARDIOGRAM REPORT: CPT | Mod: 76

## 2018-11-15 PROCEDURE — 19125 EXCISION BREAST LESION: CPT

## 2018-11-15 RX ORDER — FAMOTIDINE 10 MG/ML
20 INJECTION INTRAVENOUS ONCE
Qty: 0 | Refills: 0 | Status: COMPLETED | OUTPATIENT
Start: 2018-11-15 | End: 2018-11-15

## 2018-11-15 RX ADMIN — FAMOTIDINE 20 MILLIGRAM(S): 10 INJECTION INTRAVENOUS at 06:39

## 2018-11-15 NOTE — CONSULT NOTE ADULT - SUBJECTIVE AND OBJECTIVE BOX
Patient is a 63y old  Female who presents with a chief complaint of Lobular neoplasia of the right breast (15 Nov 2018 08:36)      HPI:  Asked to see this 63 year old woman with paroxysmal atrial fibrillation who appeared to develop 2:1 heart block after induction of anaesthesia. Procedure was aborted.   She reports she has palpitations and atrial fibrillation usually lasting minutes to an hour on a daily basis. She denies any CP, SOB, lightheadedness, dizziness, syncope, pre-syncope.      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  Thyroid cyst: removed  Diabetes insipidus  Chronic low back pain without sciatica, unspecified back pain laterality  Malignant neoplasm of left breast in female, estrogen receptor positive, unspecified site of breast  Varicose vein of leg: right lower extremity surgery  Palpitation  Hyperlipidemia, unspecified hyperlipidemia type  Essential hypertension  Chronic atrial fibrillation  S/P breast lumpectomy: 2017  H/O varicose veins of lower extremity: right .      Home Medications:  anastrozole 1 mg oral tablet: 1 tab(s) orally once a day (15 Nov 2018 06:31)  aspirin 81 mg oral tablet: 1 tab(s) orally once a day  hold preop as per surgeon and PMD (15 Nov 2018 06:31)  desmopressin 0.1 mg oral tablet: 1 tab(s) orally 3 times a day (15 Nov 2018 06:31)  sotalol 120 mg oral tablet: 1 tab(s) orally 2 times a day (15 Nov 2018 06:31)      MEDICATIONS  (STANDING):  sodium chloride 0.9% lock flush 3 milliLiter(s) IV Push every 8 hours    MEDICATIONS  (PRN):      FAMILY HISTORY:  No pertinent family history in first degree relatives      SOCIAL HISTORY:   No toabacco or ETOH.     ROS:     A comprehensive review of systems was performed and pertinent items are noted in the history above. A detailed ROS is as follows:    Constitutional	     Negative for anorexia, appetite changes, chills, fatigue, fevers, malaise, sweats and weight gain / loss.  Eyes: 	                         Negative for icterus, irritation, redness and visual disturbance.  ENT, mouth and face:	     Negative for ear discharge, earaches, hearing loss, tinnitus,  epistaxis, nasal congestion, snoring, sleep apnea, oral sores, dental and gum infection, sore throat hoarseness or voice change.  Neck:	                         Negative for thyroid enlargement, neck pain, swollen glands and difficulty in swallowing  Respiratory:                       Negative for asthma, chronic bronchitis, cough, dyspnea on exertion, emphysema, hemoptysis, pleurisy/chest pain, pneumonia, sputum, stridor and wheezing  Cardiovascular:                  Negative for chest pain, dyspnea, fatigue, irregular heart beat, near-syncope, orthopnea, palpitations, paroxysmal nocturnal dyspnea and syncope  Gastrointestinal:	      Negative for abdominal pain, nausea, vomiting, change in bowel habits, constipation, diarrhea, dyspepsia, dysphagia, odynophagia, reflux symptoms, jaundice, hematemesis, melena and hematochezia.  Genitourinary:	      Negative for genital lesions, discharge, bleeding, sexual problems, dysuria, frequency, hematuria and urinary incontinence.  Skin / Breast: 	      Negative for breast lump, breast tenderness. Negative for skin rash, redness, pruritis, swelling dryness and fissures.  Hematologic/lymphatic:   Negative for bleeding disorder, clotting disorder, petechial rash, easy bruising and lymphadenopathy.  Musculoskeletal:	      Negative for arthralgias, back pain, bone pain, muscle weakness, myalgias, neck pain and stiff joints  Vascular:	                          No leg pain, cramps, discoloration or edema.   Neurological:	      Negative for coordination problems, dizziness, gait problems, headaches, memory problems, paresthesia, seizures, speech problems, tremors, vertigo and weakness  Behavioral/Psych: 	      Negative for mood change, depression, anxiety, suicidal attempts.  Endocrine:	                          Negative for blurry vision, increased fatigue, polydipsia, polyphagia, polyuria, poor wound healing, pruritus, skin dryness and weight loss, fertility problems and temperature intolerance.  Allergic/Immunologic:	      Negative for anaphylaxis, angioedema and urticaria.      Vital Signs Last 24 Hrs  T(C): 36.7 (15 Nov 2018 08:26), Max: 36.7 (15 Nov 2018 06:40)  T(F): 98 (15 Nov 2018 08:26), Max: 98 (15 Nov 2018 06:40)  HR: 87 (15 Nov 2018 09:15) (56 - 87)  BP: 130/78 (15 Nov 2018 09:15) (97/38 - 131/61)  BP(mean): --  RR: 14 (15 Nov 2018 09:15) (14 - 16)  SpO2: 100% (15 Nov 2018 09:15) (99% - 100%)    I&O's Summary    15 Nov 2018 07:01  -  15 Nov 2018 09:48  --------------------------------------------------------  IN: 800 mL / OUT: 0 mL / NET: 800 mL          INTERPRETATION OF TELEMETRY:  AF rates 70's to 100's     EC18 SB 52 1 AV block RUTH PRWP QTc 442 ms.   11/15/18 AF 88 bpm.  ms.       LABS:    Basic Metabolic Panel (18 @ 06:34)    Sodium, Serum: 142 mmol/L    Potassium, Serum: 3.9 mmol/L    Chloride, Serum: 108 mmol/L    Carbon Dioxide, Serum: 27 mmol/L    Anion Gap, Serum: 7 mmol/L    Blood Urea Nitrogen, Serum: 16 mg/dL    Creatinine, Serum: 0.72 mg/dL    Glucose, Serum: 102 mg/dL    Calcium, Total Serum: 8.7 mg/dL    eGFR if Non : 88: Interpretative comment  The units for eGFR are ml/min/1.73m2 (normalized body surface area). The  eGFR is calculated from a serum creatinine using the CKD-EPI equation.  Other variables required for calculation are race, age and sex. Among  patients with chronic kidney disease (CKD), the eGFR is useful in  determining the stage of disease according to KDOQI CKD classification.  All eGFR results are reported numerically with the following  interpretation.          GFR                    With                 Without     (ml/min/1.73 m2)    Kidney Damage       Kidney Damage        >= 90                    Stage 1                     Normal        60-89                    Stage 2                     Decreased GFR        30-59     Stage 3                     Stage 3        15-29                    Stage 4                     Stage 4        < 15                      Stage 5                     Stage 5  Each stage of CKD assumes that the associated GFR level has been in  effect for at least 3 months. Determination of stages one and two (with  eGFR > 59 ml/min/m2) requires estimation of kidney damage for at least 3  months as defined by structural or functional abnormalities.  Limitations: All estimates of GFR will be less accurate for patients at  extremes of muscle mass (including but not limited to frail elderly,  critically ill, or cancer patients), those with unusual diets, and those  with conditions associated with reduced secretion or extrarenal  elimination of creatinine. The eGFR equation is not recommended for use  in patients with unstable creatinine levels. mL/min/1.73M2    eGFR if African American: 103 mL/min/1.73M2

## 2018-11-15 NOTE — ASU DISCHARGE PLAN (ADULT/PEDIATRIC). - COMMENTS
follow up with Dr. Vang to reschedule surgery (Right breast biopsy). Follow up with Cardiologist  Dr. Guadalupe

## 2018-11-15 NOTE — ASU DISCHARGE PLAN (ADULT/PEDIATRIC). - MEDICATION SUMMARY - MEDICATIONS TO TAKE
I will START or STAY ON the medications listed below when I get home from the hospital:    aspirin 81 mg oral tablet  -- 1 tab(s) by mouth once a day  hold preop as per surgeon and PMD  -- Indication: For home    sotalol 120 mg oral tablet  -- 1 tab(s) by mouth 2 times a day  -- Indication: For home    rivaroxaban 20 mg oral tablet  -- 1 tab(s) by mouth every 24 hours  hold preop as per surgeon and PMD instructions  -- Indication: For home    desmopressin 0.1 mg oral tablet  -- 1 tab(s) by mouth 3 times a day  -- Indication: For home    atorvastatin 40 mg oral tablet  -- 1 tab(s) by mouth once a day (at bedtime)  -- Indication: For home    anastrozole 1 mg oral tablet  -- 1 tab(s) by mouth once a day  -- Indication: For home

## 2018-11-15 NOTE — CONSULT NOTE ADULT - ASSESSMENT
63 year old woman with paroxysmal atrial fibrillation with frequent recurrences. She is somewhat controlled with sotalol usually. SHe took sotalol at 10 PM last night then again at 5:00 AM this AM. Underwent induction of anaesthesia and became bradycardic. Initially it appeared she was in 2:1 heart block, but review of strip demonstrates p-wave in t-wave is premature.  It more likely represernts a large U wave induced by the bradycardia and the reverse use dependence of sotalol.  In recovery she went into AF with controlled rate.   Recommend:   Can discharge to home.   Would resume xarelto and sotalol at current doses tonight.   Would reschedule procedure and hold sotalol 12 hours prior ( no PM dose day prior and no AM dose on day of surgery)  would resume sotalol night after surgery.

## 2018-11-15 NOTE — PROGRESS NOTE ADULT - SUBJECTIVE AND OBJECTIVE BOX
63 year old female was admitted to [facility] this morning for right excisional breast biopsies x 2 for diagnoses of LCIS on core biopsy.  Patient's heart rate on admission was 48.  Shortly after receiving Diprivan the patient's HR dropped into the 20's and BP to < 100 systolic.  EKG was abnormal with questionably dropped beats.  Therefore, the surgical case was cancelled.  Patient to be evaluated by cardiology/EP.  Discussed with Dr. Leslie Plummer, the patient's cardiologist. She would like to see the patient in her office tomorrow.      Sarita Vang MD FACS

## 2018-11-15 NOTE — PROVIDER CONTACT NOTE (OTHER) - REASON
pt returned from pacu surgery cancelled do to ekg change cardiac c/s done ok to d/c pt with d/c instructions pt to reschedule surgery and f/u with cardiology d/c home

## 2018-11-26 PROBLEM — E23.2 DIABETES INSIPIDUS: Status: ACTIVE | Noted: 2017-08-28

## 2018-11-28 ENCOUNTER — RESULT REVIEW (OUTPATIENT)
Age: 63
End: 2018-11-28

## 2018-11-28 ENCOUNTER — OUTPATIENT (OUTPATIENT)
Dept: OUTPATIENT SERVICES | Facility: HOSPITAL | Age: 63
LOS: 1 days | Discharge: HOME | End: 2018-11-28
Payer: COMMERCIAL

## 2018-11-28 ENCOUNTER — APPOINTMENT (OUTPATIENT)
Dept: BREAST CENTER | Facility: HOSPITAL | Age: 63
End: 2018-11-28
Payer: COMMERCIAL

## 2018-11-28 VITALS
RESPIRATION RATE: 16 BRPM | OXYGEN SATURATION: 97 % | SYSTOLIC BLOOD PRESSURE: 137 MMHG | DIASTOLIC BLOOD PRESSURE: 95 MMHG | WEIGHT: 184.09 LBS | HEART RATE: 103 BPM | TEMPERATURE: 98 F | HEIGHT: 61 IN

## 2018-11-28 VITALS
RESPIRATION RATE: 16 BRPM | SYSTOLIC BLOOD PRESSURE: 119 MMHG | HEART RATE: 100 BPM | DIASTOLIC BLOOD PRESSURE: 75 MMHG | TEMPERATURE: 98 F | OXYGEN SATURATION: 96 %

## 2018-11-28 DIAGNOSIS — Z86.79 PERSONAL HISTORY OF OTHER DISEASES OF THE CIRCULATORY SYSTEM: Chronic | ICD-10-CM

## 2018-11-28 DIAGNOSIS — Z98.890 OTHER SPECIFIED POSTPROCEDURAL STATES: Chronic | ICD-10-CM

## 2018-11-28 DIAGNOSIS — D05.01 LOBULAR CARCINOMA IN SITU OF RIGHT BREAST: ICD-10-CM

## 2018-11-28 DIAGNOSIS — E23.2 DIABETES INSIPIDUS: ICD-10-CM

## 2018-11-28 LAB
APTT BLD: 38.7 SEC — HIGH (ref 27.5–36.3)
BLD GP AB SCN SERPL QL: SIGNIFICANT CHANGE UP
INR BLD: 1.19 RATIO — HIGH (ref 0.88–1.16)
PROTHROM AB SERPL-ACNC: 13.3 SEC — HIGH (ref 10–12.9)
TYPE + AB SCN PNL BLD: SIGNIFICANT CHANGE UP

## 2018-11-28 PROCEDURE — 19126 EXCISION ADDL BREAST LESION: CPT

## 2018-11-28 PROCEDURE — 88307 TISSUE EXAM BY PATHOLOGIST: CPT | Mod: 26

## 2018-11-28 PROCEDURE — 19125 EXCISION BREAST LESION: CPT

## 2018-11-28 PROCEDURE — 76098 X-RAY EXAM SURGICAL SPECIMEN: CPT | Mod: 26

## 2018-11-28 RX ORDER — MEPERIDINE HYDROCHLORIDE 50 MG/ML
12.5 INJECTION INTRAMUSCULAR; INTRAVENOUS; SUBCUTANEOUS
Qty: 0 | Refills: 0 | Status: DISCONTINUED | OUTPATIENT
Start: 2018-11-28 | End: 2018-11-28

## 2018-11-28 RX ORDER — SODIUM CHLORIDE 9 MG/ML
3 INJECTION INTRAMUSCULAR; INTRAVENOUS; SUBCUTANEOUS EVERY 8 HOURS
Qty: 0 | Refills: 0 | Status: DISCONTINUED | OUTPATIENT
Start: 2018-11-28 | End: 2018-11-28

## 2018-11-28 RX ORDER — METOCLOPRAMIDE HCL 10 MG
10 TABLET ORAL ONCE
Qty: 0 | Refills: 0 | Status: COMPLETED | OUTPATIENT
Start: 2018-11-28 | End: 2018-11-28

## 2018-11-28 RX ORDER — ASPIRIN/CALCIUM CARB/MAGNESIUM 324 MG
1 TABLET ORAL
Qty: 0 | Refills: 0 | COMMUNITY

## 2018-11-28 RX ORDER — FENTANYL CITRATE 50 UG/ML
50 INJECTION INTRAVENOUS
Qty: 0 | Refills: 0 | Status: DISCONTINUED | OUTPATIENT
Start: 2018-11-28 | End: 2018-11-28

## 2018-11-28 RX ORDER — OXYCODONE HYDROCHLORIDE 5 MG/1
5 TABLET ORAL ONCE
Qty: 0 | Refills: 0 | Status: DISCONTINUED | OUTPATIENT
Start: 2018-11-28 | End: 2018-11-28

## 2018-11-28 RX ORDER — ONDANSETRON 8 MG/1
4 TABLET, FILM COATED ORAL ONCE
Qty: 0 | Refills: 0 | Status: DISCONTINUED | OUTPATIENT
Start: 2018-11-28 | End: 2018-11-28

## 2018-11-28 RX ORDER — FAMOTIDINE 10 MG/ML
20 INJECTION INTRAVENOUS ONCE
Qty: 0 | Refills: 0 | Status: COMPLETED | OUTPATIENT
Start: 2018-11-28 | End: 2018-11-28

## 2018-11-28 RX ORDER — SODIUM CHLORIDE 9 MG/ML
1000 INJECTION, SOLUTION INTRAVENOUS
Qty: 0 | Refills: 0 | Status: DISCONTINUED | OUTPATIENT
Start: 2018-11-28 | End: 2018-11-28

## 2018-11-28 RX ADMIN — Medication 10 MILLIGRAM(S): at 07:28

## 2018-11-28 RX ADMIN — FAMOTIDINE 20 MILLIGRAM(S): 10 INJECTION INTRAVENOUS at 07:28

## 2018-11-28 NOTE — BRIEF OPERATIVE NOTE - PROCEDURE
<<-----Click on this checkbox to enter Procedure Excisional breast biopsy with radiologic localization  11/28/2018  x 2 with Jessica localization  Active  JAYLEN

## 2018-11-28 NOTE — ASU DISCHARGE PLAN (ADULT/PEDIATRIC). - MEDICATION SUMMARY - MEDICATIONS TO TAKE
I will START or STAY ON the medications listed below when I get home from the hospital:    sotalol 120 mg oral tablet  -- 1 tab(s) by mouth 2 times a day  -- Indication: For home    rivaroxaban 20 mg oral tablet  -- 1 tab(s) by mouth every 24 hours  hold preop as per surgeon and PMD instructions  -- Indication: For home    desmopressin 0.1 mg oral tablet  -- 1 tab(s) by mouth 3 times a day  -- Indication: For home    atorvastatin 40 mg oral tablet  -- 1 tab(s) by mouth once a day (at bedtime)  -- Indication: For home    anastrozole 1 mg oral tablet  -- 1 tab(s) by mouth once a day  -- Indication: For home

## 2018-11-28 NOTE — BRIEF OPERATIVE NOTE - OPERATION/FINDINGS
Localizing clips and Jessica reflectors identified at both sites ( 12 and 1:00) on specimen radiograph

## 2018-11-28 NOTE — ASU DISCHARGE PLAN (ADULT/PEDIATRIC). - SPECIAL INSTRUCTIONS
may continue with ice packs to area over the next 24-48 hours to help alleviate discomfort (apply ice for 15 minutes at a time)  may resume Xarelto in 48 hours  may resume Sotalol as per cardiologist  Avoid use of NSAIDS (e.g. Advil, Aspirin , Ibuprofen) until discussed with surgeon at post op office visit

## 2018-11-28 NOTE — ASU PATIENT PROFILE, ADULT - PMH
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

## 2018-11-28 NOTE — ASU DISCHARGE PLAN (ADULT/PEDIATRIC). - MEDICATION SUMMARY - MEDICATIONS TO STOP TAKING
I will STOP taking the medications listed below when I get home from the hospital:    aspirin 81 mg oral tablet  -- 1 tab(s) by mouth once a day  hold preop as per surgeon and PMD

## 2018-11-29 DIAGNOSIS — D05.81 OTHER SPECIFIED TYPE OF CARCINOMA IN SITU OF RIGHT BREAST: ICD-10-CM

## 2018-11-29 DIAGNOSIS — Z53.9 PROCEDURE AND TREATMENT NOT CARRIED OUT, UNSPECIFIED REASON: ICD-10-CM

## 2018-12-04 LAB — SURGICAL PATHOLOGY FINAL REPORT - CH: SIGNIFICANT CHANGE UP

## 2018-12-05 DIAGNOSIS — I10 ESSENTIAL (PRIMARY) HYPERTENSION: ICD-10-CM

## 2018-12-05 DIAGNOSIS — I48.91 UNSPECIFIED ATRIAL FIBRILLATION: ICD-10-CM

## 2018-12-05 DIAGNOSIS — D05.01 LOBULAR CARCINOMA IN SITU OF RIGHT BREAST: ICD-10-CM

## 2018-12-05 DIAGNOSIS — Z85.3 PERSONAL HISTORY OF MALIGNANT NEOPLASM OF BREAST: ICD-10-CM

## 2018-12-06 ENCOUNTER — APPOINTMENT (OUTPATIENT)
Dept: BREAST CENTER | Facility: CLINIC | Age: 63
End: 2018-12-06
Payer: COMMERCIAL

## 2018-12-06 VITALS
WEIGHT: 182 LBS | SYSTOLIC BLOOD PRESSURE: 132 MMHG | DIASTOLIC BLOOD PRESSURE: 72 MMHG | HEART RATE: 56 BPM | BODY MASS INDEX: 34.36 KG/M2 | HEIGHT: 61 IN

## 2018-12-06 PROCEDURE — 99024 POSTOP FOLLOW-UP VISIT: CPT

## 2018-12-06 RX ORDER — SOTALOL HYDROCHLORIDE 120 MG/1
120 TABLET ORAL TWICE DAILY
Qty: 90 | Refills: 1 | Status: DISCONTINUED | COMMUNITY
Start: 2017-05-08 | End: 2018-12-06

## 2018-12-06 RX ORDER — OXYCODONE 5 MG/1
5 TABLET ORAL EVERY 4 HOURS
Qty: 10 | Refills: 0 | Status: DISCONTINUED | COMMUNITY
Start: 2018-11-14 | End: 2018-12-06

## 2018-12-06 RX ORDER — ASPIRIN 325 MG/1
TABLET, FILM COATED ORAL
Refills: 0 | Status: DISCONTINUED | COMMUNITY
End: 2018-12-06

## 2019-05-15 PROBLEM — I63.9 CEREBRAL INFARCTION, UNSPECIFIED: Chronic | Status: ACTIVE | Noted: 2018-11-28

## 2019-05-23 ENCOUNTER — RX RENEWAL (OUTPATIENT)
Age: 64
End: 2019-05-23

## 2019-06-18 ENCOUNTER — APPOINTMENT (OUTPATIENT)
Dept: BREAST CENTER | Facility: CLINIC | Age: 64
End: 2019-06-18
Payer: COMMERCIAL

## 2019-06-18 VITALS
BODY MASS INDEX: 34.36 KG/M2 | HEART RATE: 50 BPM | HEIGHT: 61 IN | WEIGHT: 182 LBS | SYSTOLIC BLOOD PRESSURE: 135 MMHG | DIASTOLIC BLOOD PRESSURE: 66 MMHG

## 2019-06-18 PROCEDURE — 99214 OFFICE O/P EST MOD 30 MIN: CPT

## 2019-06-18 NOTE — DATA REVIEWED
[FreeTextEntry1] : Pathology:  11/28/18   Findings:  Lobular neoplasia including ALH and LCIS at 12 and 1:00 sites Right breast

## 2019-06-18 NOTE — HISTORY OF PRESENT ILLNESS
[FreeTextEntry1] : 64 year old female is here for a surveillance breast examination.  She has a history of stage 1 left breast carcinoma and LCIS of the right breast.\par \par

## 2019-09-19 ENCOUNTER — FORM ENCOUNTER (OUTPATIENT)
Age: 64
End: 2019-09-19

## 2019-09-20 ENCOUNTER — OUTPATIENT (OUTPATIENT)
Dept: OUTPATIENT SERVICES | Facility: HOSPITAL | Age: 64
LOS: 1 days | End: 2019-09-20
Payer: COMMERCIAL

## 2019-09-20 ENCOUNTER — APPOINTMENT (OUTPATIENT)
Dept: MAMMOGRAPHY | Facility: CLINIC | Age: 64
End: 2019-09-20
Payer: COMMERCIAL

## 2019-09-20 ENCOUNTER — APPOINTMENT (OUTPATIENT)
Dept: ULTRASOUND IMAGING | Facility: CLINIC | Age: 64
End: 2019-09-20
Payer: COMMERCIAL

## 2019-09-20 DIAGNOSIS — Z00.8 ENCOUNTER FOR OTHER GENERAL EXAMINATION: ICD-10-CM

## 2019-09-20 DIAGNOSIS — Z98.890 OTHER SPECIFIED POSTPROCEDURAL STATES: Chronic | ICD-10-CM

## 2019-09-20 DIAGNOSIS — D05.01 LOBULAR CARCINOMA IN SITU OF RIGHT BREAST: ICD-10-CM

## 2019-09-20 DIAGNOSIS — Z86.79 PERSONAL HISTORY OF OTHER DISEASES OF THE CIRCULATORY SYSTEM: Chronic | ICD-10-CM

## 2019-09-20 PROCEDURE — 76641 ULTRASOUND BREAST COMPLETE: CPT | Mod: 26,50

## 2019-09-20 PROCEDURE — G0279: CPT | Mod: 26

## 2019-09-20 PROCEDURE — G0279: CPT

## 2019-09-20 PROCEDURE — 76641 ULTRASOUND BREAST COMPLETE: CPT

## 2019-09-20 PROCEDURE — 77066 DX MAMMO INCL CAD BI: CPT

## 2019-09-20 PROCEDURE — 77066 DX MAMMO INCL CAD BI: CPT | Mod: 26

## 2019-11-18 ENCOUNTER — APPOINTMENT (OUTPATIENT)
Dept: BREAST CENTER | Facility: CLINIC | Age: 64
End: 2019-11-18
Payer: COMMERCIAL

## 2019-11-18 VITALS
WEIGHT: 182 LBS | DIASTOLIC BLOOD PRESSURE: 70 MMHG | HEIGHT: 61 IN | SYSTOLIC BLOOD PRESSURE: 113 MMHG | HEART RATE: 53 BPM | BODY MASS INDEX: 34.36 KG/M2

## 2019-11-18 PROCEDURE — 99213 OFFICE O/P EST LOW 20 MIN: CPT

## 2019-11-18 RX ORDER — HYDROCODONE BITARTRATE AND HOMATROPINE METHYLBROMIDE 5; 1.5 MG/5ML; MG/5ML
5-1.5 SOLUTION ORAL
Qty: 120 | Refills: 0 | Status: DISCONTINUED | COMMUNITY
Start: 2019-02-08 | End: 2019-11-18

## 2019-11-18 RX ORDER — AMOXICILLIN AND CLAVULANATE POTASSIUM 875; 125 MG/1; MG/1
875-125 TABLET, COATED ORAL
Qty: 20 | Refills: 0 | Status: DISCONTINUED | COMMUNITY
Start: 2019-02-08 | End: 2019-11-18

## 2019-11-18 NOTE — DATA REVIEWED
[FreeTextEntry1] : Pathology:  11/28/18   Findings:  Lobular neoplasia including ALH and LCIS at 12 and 1:00 sites Right breast\par \par Mammogram/sonogram:  9/20/19  Findings: No mammographic evidence of malignancy.   1 cm nodule/LN with a postop biopsy clip right breast.

## 2019-11-18 NOTE — PHYSICAL EXAM
[Sclera nonicteric] : sclera nonicteric [Conjunctiva pink] : conjunctiva pink [Supple] : supple [No Cervical Adenopathy] : no cervical adenopathy [No Thyromegaly] : no thyromegaly [No Caroid Bruits] : no carotid bruits [Clear to Auscultation Bilat] : clear to auscultation bilaterally [Normal Sinus Rhythm] : normal sinus rhythm [No Gallops] : no gallops [No Rubs] : no pericardial rub [Symmetrical] : symmetrical [Grade 3] : Ptosis Grade 3 [No Axillary Lymphadenopathy] : no left axillary lymphadenopathy [Soft] : abdomen soft [Normal Bowel Sounds] : normal bowel sounds  [No Hepato-Splenomegaly] : no hepato-splenomegaly [No Swelling] : no swelling [de-identified] : Surgical incision at 12-1:00 ( LCIS). [de-identified] : Lumpectomy at 7:00.

## 2020-01-02 NOTE — ASU PATIENT PROFILE, ADULT - NSALCOHOLAMT_GEN_A_CORE_SD
Progress Note - Mark Soto 1950, 78 y o  female MRN: [id number]    Unit/Bed#: -01 Encounter: [id number]    Primary Care Provider: Hodan Hess MD   Date and time admitted to hospital: 12/31/2019  9:24 AM    ASSESSMENT/PLAN:   1  Acute on chronic diastolic HF   · Likely precipitated by afib with RVR   · Continue lasix 40mg IV BID  · Weight down 6 lbs and net urine output -2L since admission   · Dry weight is around 170 lbs - still has about 6 lbs to lose     2  pAF with RVR  · S/p SUSIE/DCCV 8/30/19   · Recent TSH normal  Normal LV function  · Rates 120-150 this morning on cardizem 360mg daily and lopressor 50mg BID - increase lopressor to 50mg TID  Would not increase AV eb blockade any further as she has been bradycardic in the past when she converts to sinus   · Repeat EP evaluation as outpatient   · Needs ischemic testing prior to antiarrhythmic therapy - will arrange outpatient Lexiscan  · Sleep study pending  · Anticoagulated on Eliquis    3  HTN - controlled on lopressor and cardizem    4  HLD - on atorvastatin     SUBJECTIVE  HPI: The patient was seen and examined at the bedside  No events overnight  Dyspnea and edema are improving however she is still not back to baseline  She is still requiring supplemental oxygen - she removed it briefly this morning to use the restroom and became notably dyspneic  Dry weight is about 170 lbs and she was 182 lbs on arrival - she was 176 this morning  Telemetry: Afib with RVR - rates 120-150 this morning    Most recent cardiac testing:    SUSIE 8/30/19 - EF 60%, mild left atrial enlargement  Probable tiny PFO  Trace MR/AI    Mild TR    Current medications:     Current Facility-Administered Medications:     acetaminophen (TYLENOL) tablet 650 mg, 650 mg, Oral, Q4H PRN, Vale Lai PA-C    apixaban (ELIQUIS) tablet 5 mg, 5 mg, Oral, BID, Teresita De La Paz PA-C, 5 mg at 01/02/20 0914    atorvastatin (LIPITOR) tablet 40 mg, 40 mg, Oral, Daily With Dinner, Mesfin Yip PA-C, 40 mg at 01/01/20 1545    diltiazem (CARDIZEM CD) 24 hr capsule 360 mg, 360 mg, Oral, Daily, Mesfin Yip PA-C, 360 mg at 01/02/20 0914    escitalopram (LEXAPRO) tablet 20 mg, 20 mg, Oral, Daily, Mesfin Yip PA-C, Stopped at 01/02/20 2229    furosemide (LASIX) injection 40 mg, 40 mg, Intravenous, BID, Mesfin Yip PA-C, 40 mg at 01/02/20 0914    hydrocodone-chlorpheniramine polistirex (TUSSIONEX) 10-8 mg/5 mL ER suspension 5 mL, 5 mL, Oral, Q12H PRN, Mesfin Yip PA-C    levalbuterol (XOPENEX) inhalation solution 1 25 mg, 1 25 mg, Nebulization, Q6H PRN, Juan Pablo Hill MD    lisinopril (ZESTRIL) tablet 5 mg, 5 mg, Oral, Daily, Mesfin Yip PA-C, 5 mg at 01/02/20 0914    metoprolol (LOPRESSOR) injection 5 mg, 5 mg, Intravenous, Q6H PRN, Mesfin Yip PA-C, 5 mg at 01/01/20 1029    metoprolol tartrate (LOPRESSOR) tablet 50 mg, 50 mg, Oral, TID, Mariia Amaya PA-C    ondansetron [facility]) injection 4 mg, 4 mg, Intravenous, Q6H PRN, Mesfin Yip PA-C    sodium chloride 0 9 % inhalation solution 3 mL, 3 mL, Nebulization, Q6H PRN, Juan Pablo Hill MD     Allergies   Allergen Reactions    Other Itching     lobster    Penicillins Rash     OBJECTIVE  Vitals: Blood pressure 127/85, pulse (!) 129, temperature 97 5 °F (36 4 °C), temperature source Temporal, resp  rate 20, height 5' 2" (1 575 m), weight 125 kg (276 lb 0 3 oz), SpO2 94 %  , Body mass index is 50 48 kg/m² ,   Orthostatic Blood Pressures      Most Recent Value   Blood Pressure  127/85 filed at 01/02/2020 2934   Patient Position - Orthostatic VS  Lying filed at 01/02/2020 7856        Physical Exam   Physical Exam   Constitutional: She is oriented to person, place, and time  She appears well-developed and well-nourished  No distress  HENT:   Head: Normocephalic and atraumatic  Eyes: EOM are normal  No scleral icterus  Neck: Normal range of motion  Neck supple  Cardiovascular: S1 normal and S2 normal  An irregularly irregular rhythm present  Tachycardia present  No murmur heard  Pulmonary/Chest: Effort normal and breath sounds normal  She has no wheezes  She has no rales  Abdominal: Soft  Bowel sounds are normal    Musculoskeletal: She exhibits edema (2-3+)  Neurological: She is alert and oriented to person, place, and time  Skin: Skin is warm and dry  Psychiatric: She has a normal mood and affect  Her behavior is normal    Nursing note and vitals reviewed      Lab Results:   Troponins:   Results from last 7 days   Lab Units 01/01/20  0948 01/01/20  0525 12/31/19  0959   CK TOTAL U/L  --   --  61   TROPONIN I ng/mL <0 03 <0 03 <0 03     BNP:  Results from last 6 Months   Lab Units 12/17/19  1244 08/26/19  0918   BNP pg/mL 354* 499*     CBC with diff:   Results from last 7 days   Lab Units 01/02/20  0502 01/01/20  0525 12/31/19  0959   WBC Thousand/uL 10 70 9 00 13 80*   HEMOGLOBIN g/dL 11 8* 12 0 12 0   HEMATOCRIT % 37 0* 37 1* 37 9*   PLATELETS Thousands/uL 312 260 254   RBC Million/uL 4 18 4 21 4 30     CMP:  Results from last 7 days   Lab Units 01/02/20  0502 01/01/20  0532 12/31/19  0959   POTASSIUM mmol/L 3 5 3 5 3 5   CHLORIDE mmol/L 102 100 104   CO2 mmol/L 36* 31 30   BUN mg/dL 25 20 21   CREATININE mg/dL 0 95 0 69 0 71   CALCIUM mg/dL 8 8 9 2 9 1   EGFR ml/min/1 73sq m 61 88 87     TSH:     Coags: 1-2 drinks

## 2020-03-31 RX ORDER — ANASTROZOLE TABLETS 1 MG/1
1 TABLET ORAL
Qty: 90 | Refills: 1 | Status: ACTIVE | COMMUNITY
Start: 2017-11-03 | End: 1900-01-01

## 2020-04-26 ENCOUNTER — MESSAGE (OUTPATIENT)
Age: 65
End: 2020-04-26

## 2020-05-04 LAB
SARS-COV-2 IGG SERPL IA-ACNC: 0.3 RATIO
SARS-COV-2 IGG SERPL QL IA: NEGATIVE

## 2020-05-12 NOTE — ASU DISCHARGE PLAN (ADULT/PEDIATRIC). - NOTIFY
Swelling that continues/Bleeding that does not stop
Alert-The patient is alert, awake and responds to voice. The patient is oriented to time, place, and person. The triage nurse is able to obtain subjective information.

## 2020-08-13 ENCOUNTER — APPOINTMENT (OUTPATIENT)
Dept: BREAST CENTER | Facility: CLINIC | Age: 65
End: 2020-08-13
Payer: MEDICARE

## 2020-08-13 VITALS
HEART RATE: 61 BPM | WEIGHT: 189 LBS | HEIGHT: 61 IN | BODY MASS INDEX: 35.68 KG/M2 | DIASTOLIC BLOOD PRESSURE: 52 MMHG | SYSTOLIC BLOOD PRESSURE: 108 MMHG

## 2020-08-13 PROCEDURE — 99214 OFFICE O/P EST MOD 30 MIN: CPT

## 2020-08-13 RX ORDER — AMLODIPINE BESYLATE 5 MG/1
5 TABLET ORAL
Refills: 0 | Status: ACTIVE | COMMUNITY

## 2020-08-13 RX ORDER — HYDROCHLOROTHIAZIDE 12.5 MG/1
12.5 CAPSULE ORAL
Refills: 0 | Status: ACTIVE | COMMUNITY

## 2020-08-13 NOTE — PHYSICAL EXAM
[Conjunctiva pink] : conjunctiva pink [Sclera nonicteric] : sclera nonicteric [Supple] : supple [No Cervical Adenopathy] : no cervical adenopathy [No Thyromegaly] : no thyromegaly [No Caroid Bruits] : no carotid bruits [Clear to Auscultation Bilat] : clear to auscultation bilaterally [Normal Sinus Rhythm] : normal sinus rhythm [No Gallops] : no gallops [Symmetrical] : symmetrical [No Rubs] : no pericardial rub [Grade 3] : Ptosis Grade 3 [No Axillary Lymphadenopathy] : no left axillary lymphadenopathy [Normal Bowel Sounds] : normal bowel sounds  [No Hepato-Splenomegaly] : no hepato-splenomegaly [Soft] : abdomen soft [No Swelling] : no swelling [No dominant masses] : no dominant masses in right breast  [Examined in the supine and seated position] : examined in the supine and seated position [No dominant masses] : no dominant masses left breast [No Nipple Retraction] : no left nipple retraction [No Nipple Discharge] : no left nipple discharge [de-identified] : Very large and pendulous [No Rashes] : no rashes [de-identified] : Surgical incision at 12-1:00 ( LCIS). [de-identified] : Lumpectomy at 7:00.  Tenderness on palpation 1:00 N15.

## 2020-09-21 ENCOUNTER — RESULT REVIEW (OUTPATIENT)
Age: 65
End: 2020-09-21

## 2020-09-21 ENCOUNTER — OUTPATIENT (OUTPATIENT)
Dept: OUTPATIENT SERVICES | Facility: HOSPITAL | Age: 65
LOS: 1 days | End: 2020-09-21
Payer: MEDICARE

## 2020-09-21 ENCOUNTER — APPOINTMENT (OUTPATIENT)
Dept: ULTRASOUND IMAGING | Facility: CLINIC | Age: 65
End: 2020-09-21
Payer: MEDICARE

## 2020-09-21 ENCOUNTER — APPOINTMENT (OUTPATIENT)
Dept: MAMMOGRAPHY | Facility: CLINIC | Age: 65
End: 2020-09-21
Payer: MEDICARE

## 2020-09-21 DIAGNOSIS — Z86.79 PERSONAL HISTORY OF OTHER DISEASES OF THE CIRCULATORY SYSTEM: Chronic | ICD-10-CM

## 2020-09-21 DIAGNOSIS — Z85.3 PERSONAL HISTORY OF MALIGNANT NEOPLASM OF BREAST: ICD-10-CM

## 2020-09-21 DIAGNOSIS — Z98.890 OTHER SPECIFIED POSTPROCEDURAL STATES: Chronic | ICD-10-CM

## 2020-09-21 PROCEDURE — G0279: CPT | Mod: 26

## 2020-09-21 PROCEDURE — 76641 ULTRASOUND BREAST COMPLETE: CPT

## 2020-09-21 PROCEDURE — 76641 ULTRASOUND BREAST COMPLETE: CPT | Mod: 26,50

## 2020-09-21 PROCEDURE — G0279: CPT

## 2020-09-21 PROCEDURE — 77066 DX MAMMO INCL CAD BI: CPT | Mod: 26

## 2020-09-21 PROCEDURE — 77066 DX MAMMO INCL CAD BI: CPT

## 2021-03-09 NOTE — ASU PATIENT PROFILE, ADULT - NS TRANSFER EYEGLASSES PAIRS
1 pair Please see eMAR for medications, dosages and routes. Please see eMAR for medications, dosages and routes.

## 2021-03-22 NOTE — ASU DISCHARGE PLAN (ADULT/PEDIATRIC). - YOU WERE IN THE HOSPITAL FOR:
Addended by: JOCELIN SALTER on: 3/22/2021 08:10 AM     Modules accepted: Orders    
Breast biopsy - cancelled due to decreased hear rate

## 2021-09-28 NOTE — ASU PATIENT PROFILE, ADULT - PRO ARRIVE FROM
Lumpectomy Patient:     Met with patient today who is pre-op for her right lumpectomy and right sentinel node biopsy. Patient doing well. Time spend actively listening, answering questions and offering support. I provided patient with her breast care package and explained contents to the patient. Contact information provided to patient and encouraged patient to call with any further questions or concerns.   
home

## 2022-06-06 NOTE — PATIENT PROFILE ADULT. - FUNCTIONAL SCREEN CURRENT LEVEL: DRESSING, MLM
----- Message from Delma Buckley sent at 6/6/2022 10:02 AM CDT -----  Contact: Pt [phone]  Patient is returning a phone call.  Who left a message for the patient: Mariposa Rao LPN   Does patient know what this is regarding:  N/A  Would you like a call back, or a response through your [facility] portal?:  call back   Comments:       (0) independent

## 2022-06-16 NOTE — ED STATDOCS - NS ED NOTE  TALK SOMEONE YN
No

## 2022-10-20 ENCOUNTER — INPATIENT (INPATIENT)
Facility: HOSPITAL | Age: 67
LOS: 0 days | Discharge: HOME | DRG: 65 | End: 2022-10-21
Attending: STUDENT IN AN ORGANIZED HEALTH CARE EDUCATION/TRAINING PROGRAM | Admitting: INTERNAL MEDICINE
Payer: MEDICARE

## 2022-10-20 VITALS — WEIGHT: 190.48 LBS | HEIGHT: 61 IN

## 2022-10-20 DIAGNOSIS — Z98.890 OTHER SPECIFIED POSTPROCEDURAL STATES: Chronic | ICD-10-CM

## 2022-10-20 DIAGNOSIS — Z86.79 PERSONAL HISTORY OF OTHER DISEASES OF THE CIRCULATORY SYSTEM: Chronic | ICD-10-CM

## 2022-10-20 DIAGNOSIS — R47.89 OTHER SPEECH DISTURBANCES: ICD-10-CM

## 2022-10-20 LAB
ADD ON TEST-SPECIMEN IN LAB: SIGNIFICANT CHANGE UP
ALBUMIN SERPL ELPH-MCNC: 3.7 G/DL — SIGNIFICANT CHANGE UP (ref 3.3–5)
ALP SERPL-CCNC: 109 U/L — SIGNIFICANT CHANGE UP (ref 40–120)
ALT FLD-CCNC: 23 U/L — SIGNIFICANT CHANGE UP (ref 12–78)
ANION GAP SERPL CALC-SCNC: 7 MMOL/L — SIGNIFICANT CHANGE UP (ref 5–17)
APTT BLD: 48.2 SEC — HIGH (ref 27.5–35.5)
AST SERPL-CCNC: 19 U/L — SIGNIFICANT CHANGE UP (ref 15–37)
BASOPHILS # BLD AUTO: 0.08 K/UL — SIGNIFICANT CHANGE UP (ref 0–0.2)
BASOPHILS NFR BLD AUTO: 0.9 % — SIGNIFICANT CHANGE UP (ref 0–2)
BILIRUB SERPL-MCNC: 1 MG/DL — SIGNIFICANT CHANGE UP (ref 0.2–1.2)
BUN SERPL-MCNC: 15 MG/DL — SIGNIFICANT CHANGE UP (ref 7–23)
CALCIUM SERPL-MCNC: 9.4 MG/DL — SIGNIFICANT CHANGE UP (ref 8.5–10.1)
CHLORIDE SERPL-SCNC: 103 MMOL/L — SIGNIFICANT CHANGE UP (ref 96–108)
CO2 SERPL-SCNC: 27 MMOL/L — SIGNIFICANT CHANGE UP (ref 22–31)
CREAT SERPL-MCNC: 1.17 MG/DL — SIGNIFICANT CHANGE UP (ref 0.5–1.3)
EGFR: 51 ML/MIN/1.73M2 — LOW
EOSINOPHIL # BLD AUTO: 0.25 K/UL — SIGNIFICANT CHANGE UP (ref 0–0.5)
EOSINOPHIL NFR BLD AUTO: 2.8 % — SIGNIFICANT CHANGE UP (ref 0–6)
FLUAV AG NPH QL: SIGNIFICANT CHANGE UP
FLUBV AG NPH QL: SIGNIFICANT CHANGE UP
GLUCOSE SERPL-MCNC: 107 MG/DL — HIGH (ref 70–99)
HCT VFR BLD CALC: 45.2 % — HIGH (ref 34.5–45)
HGB BLD-MCNC: 14.6 G/DL — SIGNIFICANT CHANGE UP (ref 11.5–15.5)
IMM GRANULOCYTES NFR BLD AUTO: 0.1 % — SIGNIFICANT CHANGE UP (ref 0–0.9)
INR BLD: 1.95 RATIO — HIGH (ref 0.88–1.16)
LYMPHOCYTES # BLD AUTO: 1.83 K/UL — SIGNIFICANT CHANGE UP (ref 1–3.3)
LYMPHOCYTES # BLD AUTO: 20.3 % — SIGNIFICANT CHANGE UP (ref 13–44)
MCHC RBC-ENTMCNC: 29.1 PG — SIGNIFICANT CHANGE UP (ref 27–34)
MCHC RBC-ENTMCNC: 32.3 GM/DL — SIGNIFICANT CHANGE UP (ref 32–36)
MCV RBC AUTO: 90 FL — SIGNIFICANT CHANGE UP (ref 80–100)
MONOCYTES # BLD AUTO: 0.77 K/UL — SIGNIFICANT CHANGE UP (ref 0–0.9)
MONOCYTES NFR BLD AUTO: 8.5 % — SIGNIFICANT CHANGE UP (ref 2–14)
NEUTROPHILS # BLD AUTO: 6.07 K/UL — SIGNIFICANT CHANGE UP (ref 1.8–7.4)
NEUTROPHILS NFR BLD AUTO: 67.4 % — SIGNIFICANT CHANGE UP (ref 43–77)
PLATELET # BLD AUTO: 167 K/UL — SIGNIFICANT CHANGE UP (ref 150–400)
POTASSIUM SERPL-MCNC: 3.7 MMOL/L — SIGNIFICANT CHANGE UP (ref 3.5–5.3)
POTASSIUM SERPL-SCNC: 3.7 MMOL/L — SIGNIFICANT CHANGE UP (ref 3.5–5.3)
PROT SERPL-MCNC: 7.9 GM/DL — SIGNIFICANT CHANGE UP (ref 6–8.3)
PROTHROM AB SERPL-ACNC: 22.8 SEC — HIGH (ref 10.5–13.4)
RBC # BLD: 5.02 M/UL — SIGNIFICANT CHANGE UP (ref 3.8–5.2)
RBC # FLD: 13.3 % — SIGNIFICANT CHANGE UP (ref 10.3–14.5)
RSV RNA NPH QL NAA+NON-PROBE: SIGNIFICANT CHANGE UP
SARS-COV-2 RNA SPEC QL NAA+PROBE: SIGNIFICANT CHANGE UP
SODIUM SERPL-SCNC: 137 MMOL/L — SIGNIFICANT CHANGE UP (ref 135–145)
TROPONIN I, HIGH SENSITIVITY RESULT: 5.48 NG/L — SIGNIFICANT CHANGE UP
WBC # BLD: 9.01 K/UL — SIGNIFICANT CHANGE UP (ref 3.8–10.5)
WBC # FLD AUTO: 9.01 K/UL — SIGNIFICANT CHANGE UP (ref 3.8–10.5)

## 2022-10-20 PROCEDURE — 93010 ELECTROCARDIOGRAM REPORT: CPT

## 2022-10-20 PROCEDURE — 80061 LIPID PANEL: CPT

## 2022-10-20 PROCEDURE — 83036 HEMOGLOBIN GLYCOSYLATED A1C: CPT

## 2022-10-20 PROCEDURE — 70498 CT ANGIOGRAPHY NECK: CPT | Mod: 26,MA

## 2022-10-20 PROCEDURE — 84484 ASSAY OF TROPONIN QUANT: CPT

## 2022-10-20 PROCEDURE — 71045 X-RAY EXAM CHEST 1 VIEW: CPT | Mod: 26

## 2022-10-20 PROCEDURE — 84480 ASSAY TRIIODOTHYRONINE (T3): CPT

## 2022-10-20 PROCEDURE — 97163 PT EVAL HIGH COMPLEX 45 MIN: CPT | Mod: GP

## 2022-10-20 PROCEDURE — 93306 TTE W/DOPPLER COMPLETE: CPT

## 2022-10-20 PROCEDURE — 76536 US EXAM OF HEAD AND NECK: CPT | Mod: 26

## 2022-10-20 PROCEDURE — 85027 COMPLETE CBC AUTOMATED: CPT

## 2022-10-20 PROCEDURE — 84436 ASSAY OF TOTAL THYROXINE: CPT

## 2022-10-20 PROCEDURE — 70551 MRI BRAIN STEM W/O DYE: CPT

## 2022-10-20 PROCEDURE — 71045 X-RAY EXAM CHEST 1 VIEW: CPT

## 2022-10-20 PROCEDURE — 99497 ADVNCD CARE PLAN 30 MIN: CPT | Mod: 25

## 2022-10-20 PROCEDURE — 92610 EVALUATE SWALLOWING FUNCTION: CPT | Mod: GN

## 2022-10-20 PROCEDURE — 84443 ASSAY THYROID STIM HORMONE: CPT

## 2022-10-20 PROCEDURE — 92523 SPEECH SOUND LANG COMPREHEN: CPT | Mod: GN

## 2022-10-20 PROCEDURE — 80048 BASIC METABOLIC PNL TOTAL CA: CPT

## 2022-10-20 PROCEDURE — 83735 ASSAY OF MAGNESIUM: CPT

## 2022-10-20 PROCEDURE — 99223 1ST HOSP IP/OBS HIGH 75: CPT

## 2022-10-20 PROCEDURE — 76536 US EXAM OF HEAD AND NECK: CPT

## 2022-10-20 PROCEDURE — 85610 PROTHROMBIN TIME: CPT

## 2022-10-20 PROCEDURE — 85730 THROMBOPLASTIN TIME PARTIAL: CPT

## 2022-10-20 PROCEDURE — 99285 EMERGENCY DEPT VISIT HI MDM: CPT

## 2022-10-20 PROCEDURE — 99284 EMERGENCY DEPT VISIT MOD MDM: CPT

## 2022-10-20 PROCEDURE — 70496 CT ANGIOGRAPHY HEAD: CPT | Mod: 26,MA

## 2022-10-20 PROCEDURE — 0042T: CPT | Mod: MA

## 2022-10-20 PROCEDURE — 36415 COLL VENOUS BLD VENIPUNCTURE: CPT

## 2022-10-20 RX ORDER — SODIUM CHLORIDE 9 MG/ML
1000 INJECTION, SOLUTION INTRAVENOUS
Refills: 0 | Status: DISCONTINUED | OUTPATIENT
Start: 2022-10-20 | End: 2022-10-21

## 2022-10-20 RX ORDER — SODIUM CHLORIDE 9 MG/ML
1000 INJECTION, SOLUTION INTRAVENOUS ONCE
Refills: 0 | Status: COMPLETED | OUTPATIENT
Start: 2022-10-20 | End: 2022-10-20

## 2022-10-20 RX ORDER — CHOLECALCIFEROL (VITAMIN D3) 125 MCG
1 CAPSULE ORAL
Qty: 0 | Refills: 0 | DISCHARGE

## 2022-10-20 RX ORDER — SOTALOL HCL 120 MG
120 TABLET ORAL EVERY 12 HOURS
Refills: 0 | Status: DISCONTINUED | OUTPATIENT
Start: 2022-10-20 | End: 2022-10-21

## 2022-10-20 RX ORDER — ASPIRIN/CALCIUM CARB/MAGNESIUM 324 MG
81 TABLET ORAL DAILY
Refills: 0 | Status: DISCONTINUED | OUTPATIENT
Start: 2022-10-21 | End: 2022-10-21

## 2022-10-20 RX ORDER — ACETAMINOPHEN 500 MG
650 TABLET ORAL EVERY 6 HOURS
Refills: 0 | Status: DISCONTINUED | OUTPATIENT
Start: 2022-10-20 | End: 2022-10-21

## 2022-10-20 RX ORDER — INFLUENZA VIRUS VACCINE 15; 15; 15; 15 UG/.5ML; UG/.5ML; UG/.5ML; UG/.5ML
0.7 SUSPENSION INTRAMUSCULAR ONCE
Refills: 0 | Status: DISCONTINUED | OUTPATIENT
Start: 2022-10-20 | End: 2022-10-21

## 2022-10-20 RX ORDER — AMLODIPINE BESYLATE 2.5 MG/1
5 TABLET ORAL DAILY
Refills: 0 | Status: DISCONTINUED | OUTPATIENT
Start: 2022-10-21 | End: 2022-10-21

## 2022-10-20 RX ORDER — ATORVASTATIN CALCIUM 80 MG/1
40 TABLET, FILM COATED ORAL AT BEDTIME
Refills: 0 | Status: DISCONTINUED | OUTPATIENT
Start: 2022-10-20 | End: 2022-10-21

## 2022-10-20 RX ORDER — AMLODIPINE BESYLATE 2.5 MG/1
1 TABLET ORAL
Qty: 0 | Refills: 0 | DISCHARGE

## 2022-10-20 RX ORDER — ANASTROZOLE 1 MG/1
1 TABLET ORAL
Qty: 0 | Refills: 0 | DISCHARGE

## 2022-10-20 RX ORDER — CLOPIDOGREL BISULFATE 75 MG/1
75 TABLET, FILM COATED ORAL DAILY
Refills: 0 | Status: DISCONTINUED | OUTPATIENT
Start: 2022-10-21 | End: 2022-10-21

## 2022-10-20 RX ORDER — ANASTROZOLE 1 MG/1
1 TABLET ORAL DAILY
Refills: 0 | Status: DISCONTINUED | OUTPATIENT
Start: 2022-10-20 | End: 2022-10-21

## 2022-10-20 RX ORDER — CHOLECALCIFEROL (VITAMIN D3) 125 MCG
1000 CAPSULE ORAL DAILY
Refills: 0 | Status: DISCONTINUED | OUTPATIENT
Start: 2022-10-20 | End: 2022-10-21

## 2022-10-20 RX ORDER — RIVAROXABAN 15 MG-20MG
20 KIT ORAL
Refills: 0 | Status: DISCONTINUED | OUTPATIENT
Start: 2022-10-20 | End: 2022-10-21

## 2022-10-20 RX ORDER — DESMOPRESSIN ACETATE 0.1 MG/1
1 TABLET ORAL
Qty: 0 | Refills: 0 | DISCHARGE

## 2022-10-20 RX ORDER — HYDROCHLOROTHIAZIDE 25 MG
12.5 TABLET ORAL DAILY
Refills: 0 | Status: DISCONTINUED | OUTPATIENT
Start: 2022-10-21 | End: 2022-10-21

## 2022-10-20 RX ORDER — ASPIRIN/CALCIUM CARB/MAGNESIUM 324 MG
81 TABLET ORAL DAILY
Refills: 0 | Status: DISCONTINUED | OUTPATIENT
Start: 2022-10-20 | End: 2022-10-20

## 2022-10-20 RX ORDER — SOTALOL HCL 120 MG
1 TABLET ORAL
Qty: 0 | Refills: 0 | DISCHARGE

## 2022-10-20 RX ADMIN — Medication 81 MILLIGRAM(S): at 17:22

## 2022-10-20 RX ADMIN — Medication 650 MILLIGRAM(S): at 20:23

## 2022-10-20 RX ADMIN — SODIUM CHLORIDE 100 MILLILITER(S): 9 INJECTION, SOLUTION INTRAVENOUS at 21:55

## 2022-10-20 RX ADMIN — ATORVASTATIN CALCIUM 40 MILLIGRAM(S): 80 TABLET, FILM COATED ORAL at 22:05

## 2022-10-20 RX ADMIN — SODIUM CHLORIDE 1000 MILLILITER(S): 9 INJECTION, SOLUTION INTRAVENOUS at 21:49

## 2022-10-20 RX ADMIN — RIVAROXABAN 20 MILLIGRAM(S): KIT at 22:05

## 2022-10-20 NOTE — ED ADULT NURSE NOTE - NSICDXPASTMEDICALHX_GEN_ALL_CORE_FT
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

## 2022-10-20 NOTE — ED ADULT TRIAGE NOTE - CHIEF COMPLAINT QUOTE
pt c/o difficulty speaking & word finding beginning last night at 9p. Daughter states she forgot how to use the remote. +headache +HTN. As per pt symptoms have gotten better since last night. Code Stroke called 1432 as per MD Shukla. denies balance/ambulating difficulty, vision changes or arm weakness. hx- stroke 4 years ago

## 2022-10-20 NOTE — ED PROVIDER NOTE - CLINICAL SUMMARY MEDICAL DECISION MAKING FREE TEXT BOX
admit for TIA work up. current NIH in 0. neuro eval'd pt in ed. ct head and neck neg for bleed mass or lvo.

## 2022-10-20 NOTE — H&P ADULT - NSICDXPASTSURGICALHX_GEN_ALL_CORE_FT
PAST SURGICAL HISTORY:  H/O varicose veins of lower extremity right .2015    S/P breast lumpectomy 2017

## 2022-10-20 NOTE — H&P ADULT - NEUROLOGICAL
details… cranial nerves II-XII intact/sensation intact/responds to pain/responds to verbal commands/no spontaneous movement

## 2022-10-20 NOTE — H&P ADULT - NSICDXFAMILYHX_GEN_ALL_CORE_FT
FAMILY HISTORY:  Father  Still living? Unknown  FH: stroke, Age at diagnosis: Age Unknown    Mother  Still living? Unknown  FH: HTN (hypertension), Age at diagnosis: Age Unknown

## 2022-10-20 NOTE — ED PROVIDER NOTE - NSICDXPASTMEDICALHX_GEN_ALL_CORE_FT
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

## 2022-10-20 NOTE — H&P ADULT - CONVERSATION DETAILS
18 min spent discussing advanced care planning and pt is a DNR/DNI. Pt states she would like her neighbor to become first proxy rather than that listed on her HCP form and understands current form is void and new one will need to be filled. Pt neighbor and friend is Leslie Livingston cell: [phone] home: [phone]. Pt presented MOLST from 2021 which was incomplete with no witnesses and only signed by PMD. New MOLST filled. Pt is a DNR/DNI, send to [facility], ok with fluids/IV meds/pressors/blood products/abx. Pt states should she have futile prognosis she does not want heroic measures and would like to be made comfort care. Pt states HCP knows this and states understanding that she must relay this again to her as she is filling out new HCP form. Pt does not want feeding tube under any circumstances. Witnessed and signed and placed in chart. Order placed.

## 2022-10-20 NOTE — H&P ADULT - HISTORY OF PRESENT ILLNESS
Pt is a 68 yo female with a pmh/o breast ca on anastrazole, afib who is non compliant with xarelto due to cost for "few years", HTN, bradycardia, TIA/CVA, who presents to ED today after friend noticed her speech not being baseline this afternoon. Pt states symptoms first started last night while watching TV. Was sitting and noticed she could not speak or understand how to use her everyday devices such as cell phone, DVR etc. Pt states she also had a headache and so took BP and SBP was >200. At that time she took tylenol with relief and 3 norvasc which did not improve her sx or BP. Speech later became garbled however "mouth not functioning". Could not write or remember  or name. Pt states symptoms are similar to last CVA/TIA many years ago. States event started around 9 or 10 pm and gradually improved and so went to bed. This afternoon, friend called and noticed change in her pronunciation of words and brought her to ED. Code stroke called on arrival.

## 2022-10-20 NOTE — ED PROVIDER NOTE - OBJECTIVE STATEMENT
66 y/o female with a PMHx of HTN, CVA with no residual deficits presents to the ED s/p yesterday evening at 9pm, had a pounding HA, speech unclear could not pronounce her name, could not operate the TV remote. Code stroke called. 68 y/o female with a PMHx of HTN, CVA with no residual deficits presents to the ED s/p yesterday evening at 9pm, had a pounding HA, speech unclear could not pronounce her name, could not operate the TV remote. Code stroke called. symptoms resolved in ED. not on blood thinners. was on xarelto two yuears ago but self discontinued due to cost. no fevers. no n/v. no facial droop or ext weakness. no neck pain. no falls or trauma.

## 2022-10-20 NOTE — CONSULT NOTE ADULT - ASSESSMENT
68 y/o female with a PMHx of HTN, HLD, Afib on sotalol, CVA 4 years ago with no residual deficits presents to the ED today at 14:30 hrs s/p an episode of pounding PETER that started around 9 pm yesterday, associated with speech difficulty, could not pronounce her name, was confused could not operate the TV remote, denied focal weakness, facial droop, visual blurring, N, V, vertigo, LOC. She reports she checked her BP it was very high, took 3 pills of Amlodipine, her HA improved, speech was back to normal within 2 hours.    Pt came to ED today upon friends advice, initially Code stroke called, later cancelled as pt was asymptomatic. Pt had been on xarelto previously discontinued due to cost    CT head chronic lacunar infarct within left BG + chronic small vessel ischemia.  CT angio H/N - no LVO or stenosis, CTP - No core infarct.  C5 compression fracture -incidental finding.    # TIA - resolved in 2 hours; No IV tpa given because NIHSS 0, symptoms resolved    # Hypertensive encehalopathy    # Bradycardia, A-fib    # Compression fracture -likely old    -ASA 81 mg /PLAVIX 75 mg x 3 weeks   -Atorvastatin 40 mg  -MRI brain  -Echo  -Maintain -150  -Tele, f/u with cardio A-fIB  -DVT prophylaxis  -Speech and swallow eval  -PT eval    Mangement d/w Pt / Dr. Munson

## 2022-10-20 NOTE — ED ADULT NURSE NOTE - OBJECTIVE STATEMENT
pt presents to ed for evaluation of difficulty word finding last night at 9 pm, which then resolved but not completely upon arrival to ed. code stroke called. pt vitals stable gcs 15 answering appropriately. hx of cva

## 2022-10-20 NOTE — PATIENT PROFILE ADULT - FALL HARM RISK - HARM RISK INTERVENTIONS

## 2022-10-20 NOTE — ED STATDOCS - PROGRESS NOTE DETAILS
Pt seen in pivot for concern for stroke. prior hx cva with no residual deficits and htn. last night at 9p developed word finding difficulty. denies other deficits. has been resolving but still having symptoms. made code stroke. Charles Shukla MD.  ICharles MD, performed the initial face to face bedside interview with this patient regarding history of present illness and determined that the patient should be seen in the main ED.

## 2022-10-20 NOTE — H&P ADULT - ASSESSMENT
Pt is a 68 yo female with a pmh/o HTN, HLD, Afib, Bradycardia, Breast CA, TIA, who self discontinued Xarelto due to cost, who presents to ED in setting of uncontrolled HTN last night associated with acute changes in mentation, speech, and headache. Admitted due to:    #TIA  Admit to telemetry  CT/CTA neck and head reviewed- no acute findings, see incidental findings as below  MR brain pending  TTE ordered  HbA1c and lipid panel  fall and aspiration precautions  bedside swallow eval passed  ASA and statin continued, plavix added per neurology  Neurology evaluation appreciated, recs implemented  DASH/TLC diet  PT evaluation  Neurochecks q 4 hrs  cbc, bmp, coags for AM  VCD boots for DVT prophylaxis  Neurology consult  Cardiology consult    #HTN encephalopathy  #Headache  -Reports uncontrolled HTN last night with confusion and SBP >200  -Took additional 3tabs of norvasc yesterday without improvement  -Neurochecks as above  -Cards consult as below  -Tyelnol prn Headache    #Atrial fibrillation  #Bradycardia  #EKG changes  -Cardiology consulted  -Per HIE review, has h/o bradycardia  -EKG noted to have new TWI in anterolateral leads, HR 52, Incomplete RBBB new  -CHADSVASC: 6, xarelto reinstated, had self discontinued years ago due to cost  -TTE ordered as above  -Add on troponin ordered to admitting labs, serial troponins ordered  -c/w ASA, took dose in AM and again in ED as code stroke  -c/w sotalol with holding parameters for HR<60  -c/w HCTZ  -c/w statin  -DASH/TLC diet    #Thyroid nodules  -TFTs ordered  -Thyroid US ordered    #Incidental findings  -C5 compression fracture: likely chronic and per imaging report under reported when prior imaging reviewed. Pt denies recent or remote trauma, neck pain, limitation in range of motion.  -Ground glass opacities: CXR ordered. Denies URI sx however unvaccinated against COVID. Will obtain and f/u official CXR report when made available and f/u COVID swab tonight.    #Breast CA  -c/w anastrozole

## 2022-10-20 NOTE — ED PROVIDER NOTE - PROGRESS NOTE DETAILS
nih score it 0. neuro at bedside. no lvo. no brain bleed. will admit for mri brain, telly (HR in 50's likly due to pt taking three amlodipine yesterday for her HTN) and MRI c spine to characterize c5 compression fx. no upper ext neuro deficit to suggest spinal cord injury no neck pain. no neck trauma. likely old fx.

## 2022-10-20 NOTE — ED PROVIDER NOTE - NSICDXPASTSURGICALHX_GEN_ALL_CORE_FT
PAST SURGICAL HISTORY:  H/O varicose veins of lower extremity right .2015    S/P breast lumpectomy 2017       PAST SURGICAL HISTORY:  H/O varicose veins of lower extremity right .2015    S/P breast lumpectomy 2017

## 2022-10-20 NOTE — PATIENT PROFILE ADULT - SW.
Acute Care - Occupational Therapy Initial Evaluation  [facility]     Patient Name: Akua Torres  : 1945  MRN: [id number]  Today's Date: 9/10/2019  Onset of Illness/Injury or Date of Surgery: 19  Date of Referral to OT: 09/10/19  Referring Physician: WILFREDO Dale    Admit Date: 2019       ICD-10-CM ICD-9-CM   1. Impaired functional mobility, balance, gait, and endurance Z74.09 V49.89   2. Impaired mobility and ADLs Z74.09 799.89     Patient Active Problem List   Diagnosis   • Degenerative disc disease, lumbar   • Lumbar stenosis with neurogenic claudication   • History of lumbar fusion   • Spondylosis of lumbar region without myelopathy or radiculopathy   • Mild obesity   • Physical deconditioning   • Idiopathic gout   • Anxiety and depression   • Greater trochanteric bursitis of both hips   • Status post total bilateral knee replacement   • Back pain   • HTN (hypertension)   • HLD (hyperlipidemia)   • Prediabetes   • S/P lumbar spinal fusion   • Renal insufficiency   • Leukocytosis, likely reactive   • Altered mental status. Felt to be drug related (trazodone, opiates, benzodiazepine)   • Acute renal failure due to hypotension. Now resolved (ARF) (CMS/[facility])   • Depression   • Encounter for Medicare annual wellness exam   • Arthritis of right hip   • HO A-fib (CMS/HCC)   • YUNIOR treated with BiPAP   • Status post total replacement of right hip   • Acute blood loss anemia, mild, asymptomatic     Past Medical History:   Diagnosis Date   • A-fib (CMS/[facility])    • Anxiety    • Anxiety and depression    • Arthritis    • Cataract    • Depression    • Elevated serum creatinine     SEES DR SMITH EVERY 6 MONTHS- NEPHROLOGIST    • Extremity pain    • GERD (gastroesophageal reflux disease)    • Gout    • H/O bladder infections     JUST FINISHED MACROBID ON 17 FOR RECENT UTI   • Hypercholesteremia    • Hypertension    • Joint pain    • Kidney disease    • Kidney disease, chronic, stage III (GFR  30-59 ml/min) (CMS/HCC)    • Low back pain    • Neck pain    • Rheumatic fever    • Sleep apnea    • Urinary tract infection    • Wears glasses      Past Surgical History:   Procedure Laterality Date   • BACK SURGERY  2004    LUMBAR PER DR MAN    • CARPAL TUNNEL RELEASE Left    • COLONOSCOPY     • EYE SURGERY     • FINGER SURGERY Right     3RD FINGER   • HEEL SPUR EXCISION Bilateral    • KNEE ARTHROSCOPY Bilateral    • LUMBAR DISCECTOMY FUSION INSTRUMENTATION N/A 11/10/2017    Procedure: LUMBAR SPINAL FUSION ;  Surgeon: Gopi Man MD;  Location:  [facility];  Service:    • REPLACEMENT TOTAL KNEE BILATERAL Bilateral    • TOTAL HIP ARTHROPLASTY Right 9/9/2019    Procedure: TOTAL ANTERIOR RIGHT HIP ARTHROPLASTY;  Surgeon: Darrin New MD;  Location:  [facility];  Service: Orthopedics          OT ASSESSMENT FLOWSHEET (last 12 hours)      Occupational Therapy Evaluation     Row Name 09/10/19 1500                   OT Evaluation Time/Intention    Subjective Information  complains of;weakness;fatigue;pain  -AR        Document Type  evaluation  -AR        Mode of Treatment  occupational therapy  -AR        Patient Effort  good  -AR        Symptoms Noted During/After Treatment  other (see comments) significant weakness RUE, nurse aware  -AR           General Information    Patient Profile Reviewed?  yes  -AR        Onset of Illness/Injury or Date of Surgery  09/09/19  -AR        Referring Physician  WILFREDO Dale  -AR        Patient Observations  alert;cooperative;agree to therapy  -AR        Patient/Family Observations  pt supine, no family at bedside  -AR        Prior Level of Function  min assist:;all household mobility;community mobility;gait;transfer;ADL's used cane, limited by pain and weakness  -AR        Equipment Currently Used at Home  cane, straight  -AR        Existing Precautions/Restrictions  fall;hip, anterior;right  (Significant)  new onset R-sided weakness, right knee buckling  -AR        Risks  Reviewed  patient:;LOB;nausea/vomiting;dizziness;increased discomfort;change in vital signs;increased drainage;lines disloged  -AR        Benefits Reviewed  patient:;improve function;increase independence;increase strength;increase balance;decrease pain;decrease risk of DVT;increase knowledge  -AR        Barriers to Rehab  previous functional deficit  -AR           Relationship/Environment    Primary Source of Support/Comfort  spouse  -AR        Lives With  spouse  -AR        Concerns About Impact on Relationships  spouse unable to provide assist  -AR           Resource/Environmental Concerns    Current Living Arrangements  home/apartment/condo  -AR        Resource/Environmental Concerns  home accessibility  -AR        Home Accessibility Concerns  --  -AR        Transportation Concerns  car, none  -AR           Cognitive Assessment/Interventions    Additional Documentation  Cognitive Assessment/Intervention (Group)  -AR           Cognitive Assessment/Intervention- PT/OT    Affect/Mental Status (Cognitive)  WFL  -AR        Orientation Status (Cognition)  oriented x 4  -AR        Follows Commands (Cognition)  follows one step commands;over 90% accuracy;verbal cues/prompting required  -AR        Cognitive Function (Cognitive)  WNL  -AR        Safety Deficit (Cognitive)  mild deficit  -AR           Mobility Assessment/Treatment    Extremity Weight-bearing Status  right lower extremity  -AR        Right Lower Extremity (Weight-bearing Status)  weight-bearing as tolerated (WBAT)  -AR           Bed Mobility Assessment/Treatment    Bed Mobility Assessment/Treatment  scooting/bridging;supine-sit;sit-supine  -AR        Scooting/Bridging [place] (Bed Mobility)  moderate assist (50% patient effort);verbal cues  -AR        Supine-Sit [place] (Bed Mobility)  maximum assist (25% patient effort);verbal cues  -AR        Sit-Supine [place] (Bed Mobility)  moderate assist (50% patient effort);2 person assist;verbal cues   -AR        Bed Mobility, Safety Issues  decreased use of arms for pushing/pulling;decreased use of legs for bridging/pushing;impaired trunk control for bed mobility  -AR        Assistive Device (Bed Mobility)  bed rails;draw sheet;head of bed elevated  -AR        Comment (Bed Mobility)  ongoing cues to sequence, pt limited with RUE weakness and poor trunk control  -AR           Functional Mobility    Functional Mobility- Ind. Level  unable to perform  -AR           Transfer Assessment/Treatment    Transfer Assessment/Treatment  sit-stand transfer;stand-sit transfer;toilet transfer;chair-bed transfer  -AR        Comment (Transfers)  Verbal and tactile cues for hand placement, ongoing cues to sequence. Pt limited with R knee buckling as well as difficulty advancing BLE. Nurse notified of buckling.    -AR           Chair-Bed Transfer    Chair-Bed [place] (Transfers)  maximum assist (25% patient effort);2 person assist;verbal cues;nonverbal cues (demo/gesture)  -AR        Assistive Device (Chair-Bed Transfers)  other (see comments) BUE support  -AR           Sit-Stand Transfer    Sit-Stand [place] (Transfers)  moderate assist (50% patient effort);2 person assist;verbal cues;nonverbal cues (demo/gesture)  -AR        Assistive Device (Sit-Stand Transfers)  walker, platform  -AR           Stand-Sit Transfer    Stand-Sit [place] (Transfers)  moderate assist (50% patient effort);2 person assist;verbal cues;nonverbal cues (demo/gesture)  -AR        Assistive Device (Stand-Sit Transfers)  walker, platform  -AR           Toilet Transfer    Type (Toilet Transfer)  sit-stand;stand-sit  -AR        [place] Level (Toilet Transfer)  maximum assist (25% patient effort);2 person assist;verbal cues;nonverbal cues (demo/gesture)  -AR        Assistive Device (Toilet Transfer)  commode, bedside without drop arms  -AR           ADL Assessment/Intervention    BADL Assessment/Intervention  lower body dressing;toileting   -AR           Lower Body Dressing Assessment/Training    Lower Body Dressing [place] Level  don;socks;dependent (less than 25% patient effort)  -AR        Lower Body Dressing Position  supine  -AR           Toileting Assessment/Training    [place] Level (Toileting)  toileting skills;dependent (less than 25% patient effort)  -AR        Assistive Devices (Toileting)  commode, bedside without drop arms  -AR        Toileting Position  supported sitting;supported standing  -AR           BADL Safety/Performance    Impairments, BADL Safety/Performance  balance;endurance/activity tolerance;grasp/prehension;coordination;motor control;motor planning;pain;range of motion;strength;trunk/postural control  -AR           General ROM    GENERAL ROM COMMENTS  SAME WFL, R shoulder FE 30, elbow , FA 1/2, wrist 0, minimal movement thumb and second digit  -AR           MMT (Manual Muscle Testing)    General MMT Comments  JESSICA WFL, R shoulder 2-/5, elbow 2+/5, FA 2-/5, wrist 1/5, hand 2-/5  -AR           Motor Assessment/Interventions    Additional Documentation  Balance (Group);Balance Interventions (Group);Fine Motor Testing & Training (Group);Gross Motor Coordination (Group);Therapeutic Exercise (Group);Therapeutic Exercise Interventions (Group)  -AR           Therapeutic Exercise    29003 - OT Therapeutic Activity Minutes  12  -AR           Therapeutic Exercise    Upper Extremity Range of Motion (Therapeutic Exercise)  shoulder flexion/extension, right;elbow flexion/extension, right;forearm supination/pronation, right;wrist flexion/extension, right  -AR        Hand (Therapeutic Exercise)  finger flexion/extension, right  -AR        Exercise Type (Therapeutic Exercise)  AAROM (active assistive range of motion);PROM (passive range of motion)  -AR        Position (Therapeutic Exercise)  supine  -AR        Sets/Reps (Therapeutic Exercise)  1/10  -AR           Gross Motor Coordination    Gross Motor Coordination  Interventions  -- unable to assess RUE   -AR           Balance    Balance  static sitting balance;static standing balance  -AR           Static Sitting Balance    Level of [place] (Unsupported Sitting, Static Balance)  maximal assist, 25 to 49% patient effort progressed to mod assist with LUE support  -AR        Sitting Position (Unsupported Sitting, Static Balance)  sitting on edge of bed  -AR        Time Able to Maintain Position (Unsupported Sitting, Static Balance)  more than 5 minutes  -AR           Static Standing Balance    Level of [place] (Supported Standing, Static Balance)  maximal assist, 25 to 49% patient effort;2 person assist  -AR        Time Able to Maintain Position (Supported Standing, Static Balance)  45 to 60 seconds  -AR        Assistive Device Utilized (Supported Standing, Static Balance)  walker, rolling platform  -AR           Fine Motor Testing & Training    Comment, Fine Motor Coordination  unable to oppose R hand  -AR           Sensory Assessment/Intervention    Sensory General Assessment  light touch sensation deficits identified  -AR        Additional Documentation  Vision Assessment/Intervention (Group)  -AR           Light Touch Sensation Assessment    Left Upper Extremity: Light Touch Sensation Assessment  intact  -AR        Right Upper Extremity: Light Touch Sensation Assessment  intact  -AR           Vision Assessment/Intervention    Visual Impairment/Limitations  WNL  -AR           Positioning and Restraints    Pre-Treatment Position  in bed  -AR        Post Treatment Position  bed  -AR        In Bed  notified nsg;supine;call light within reach;encouraged to call for assist;exit alarm on;with PT  -AR           Pain Scale: Numbers Pre/Post-Treatment    Pain Scale: Numbers, Pretreatment  5/10  -AR        Pain Scale: Numbers, Post-Treatment  3/10  -AR        Pain Location - Side  Right  -AR        Pain Location  hip  -AR        Pain Intervention(s)   Repositioned;Ambulation/increased activity  -AR           Wound 09/09/19 1352 Right hip Incision    Wound - Properties Group Date first assessed: 09/09/19  -KD Time first assessed: 1352  -KD Side: Right  -KD Location: hip  -KD Primary Wound Type: Incision  -KD       Plan of Care Review    Plan of Care Reviewed With  patient  -AR           Clinical Impression (OT)    Date of Referral to OT  09/10/19  -AR        OT Diagnosis  decreased independence with ADLS  -AR        Patient/Family Goals Statement (OT Eval)  go to rehab, improve strength  -AR        Criteria for Skilled Therapeutic Interventions Met (OT Eval)  yes;treatment indicated  -AR        Rehab Potential (OT Eval)  good, to achieve stated therapy goals  -AR        Therapy Frequency (OT Eval)  daily  -AR        Anticipated Discharge Disposition (OT)  inpatient rehabilitation facility  -AR           Vital Signs    Pre Systolic BP Rehab  123  -AR        Pre Treatment Diastolic BP  52  -AR        Post Systolic BP Rehab  145  -AR        Post Treatment Diastolic BP  52  -AR        Pretreatment Heart Rate (beats/min)  85  -AR        Posttreatment Heart Rate (beats/min)  85  -AR        Pre SpO2 (%)  98  -AR        O2 Delivery Pre Treatment  supplemental O2  -AR        Post SpO2 (%)  97  -AR        O2 Delivery Post Treatment  supplemental O2  -AR           OT Goals    Transfer Goal Selection (OT)  transfer, OT goal 1  -AR        Dressing Goal Selection (OT)  dressing, OT goal 1  -AR        Strength Goal Selection (OT)  strength, OT goal 1  -AR        Balance Goal Selection (OT)  balance, OT goal 1  -AR        Additional Documentation  Strength Goal Selection (OT) (Row);Balance Goal Selection (OT) (Row)  -AR           Transfer Goal 1 (OT)    Activity/Assistive Device (Transfer Goal 1, OT)  sit-to-stand/stand-to-sit;toilet;commode, bedside without drop arms;walker, platform  -AR        [place] Level/Cues Needed (Transfer Goal 1, OT)  verbal cues required;moderate  assist (50-74% patient effort)  -AR        Time Frame (Transfer Goal 1, OT)  short term goal (STG);1 week  -AR        Progress/Outcome (Transfer Goal 1, OT)  goal ongoing  -AR           Dressing Goal 1 (OT)    Activity/Assistive Device (Dressing Goal 1, OT)  upper body dressing  -AR        [place]/Cues Needed (Dressing Goal 1, OT)  minimum assist (75% or more patient effort);verbal cues required  -AR        Time Frame (Dressing Goal 1, OT)  short term goal (STG);1 week  -AR        Progress/Outcome (Dressing Goal 1, OT)  goal ongoing  -AR           Strength Goal 1 (OT)    Strength Goal 1 (OT)  Pt will increase RUE MMT 1/2 grade to support ADL function  -AR        Time Frame (Strength Goal 1, OT)  short term goal (STG);1 week  -AR        Progress/Outcome (Strength Goal 1, OT)  goal ongoing  -AR           Balance Goal 1 (OT)    Activity/Assistive Device (Balance Goal 1, OT)  sitting, static  -AR        [place] Level/Cues Needed (Balance Goal 1, OT)  verbal cues required;minimum assist (75% or more patient effort)  -AR        Time Frame (Balance Goal 1, OT)  short term goal (STG);1 week  -AR        Progress/Outcomes (Balance Goal 1, OT)  goal ongoing  -AR           Living Environment    Home Accessibility  --  -AR          User Key  (r) = Recorded By, (t) = Taken By, (c) = Cosigned By    Initials Name Effective Dates    Jennifer Castillo OT 06/22/15 -     Blas Gray RN 06/18/19 -          Occupational Therapy Education     Title: PT OT SLP Therapies (Done)     Topic: Occupational Therapy (Done)     Point: ADL training (Done)     Description: Instruct learner(s) on proper safety adaptation and remediation techniques during self care or transfers.   Instruct in proper use of assistive devices.    Learning Progress Summary           Patient Jorge, E,TB,D, VU,NR by AR at 9/10/2019  3:00 PM                   Point: Home exercise program (Done)     Description: Instruct learner(s) on appropriate  technique for monitoring, assisting and/or progressing therapeutic exercises/activities.    Learning Progress Summary           Patient Eager, E,TB,D, VU,NR by AR at 9/10/2019  3:00 PM                   Point: Precautions (Done)     Description: Instruct learner(s) on prescribed precautions during self-care and functional transfers.    Learning Progress Summary           Patient Eager, E,TB,D, VU,NR by AR at 9/10/2019  3:00 PM                   Point: Body mechanics (Done)     Description: Instruct learner(s) on proper positioning and spine alignment during self-care, functional mobility activities and/or exercises.    Learning Progress Summary           Patient Eager, E,TB,D, VU,NR by AR at 9/10/2019  3:00 PM                               User Key     Initials Effective Dates Name Provider Type Discipline    AR 06/22/15 -  Jennifer Odonnell, OT Occupational Therapist OT                  OT Recommendation and Plan  Outcome Summary/Treatment Plan (OT)  Anticipated Discharge Disposition (OT): inpatient rehabilitation facility  Therapy Frequency (OT Eval): daily  Plan of Care Review  Plan of Care Reviewed With: patient  Plan of Care Reviewed With: patient  Outcome Summary: Pt presents with decreased balance, decreased AROM and strength RUE. Strong right lateral lean noted in static sitting and R knee buckling noted with transfer training. Recommend IPR.     Outcome Measures     Row Name 09/10/19 1507 09/10/19 1500 09/10/19 0905       How much help from another person do you currently need...    Turning from your back to your side while in flat bed without using bedrails?  2  -MJ  --  2  -MJ    Moving from lying on back to sitting on the side of a flat bed without bedrails?  2  -MJ  --  2  -MJ    Moving to and from a bed to a chair (including a wheelchair)?  2  -MJ  --  2  -MJ    Standing up from a chair using your arms (e.g., wheelchair, bedside chair)?  2  -MJ  --  2  -MJ    Climbing 3-5 steps with a railing?  1   -MJ  --  1  -MJ    To walk in hospital room?  2  -MJ  --  2  -MJ    AM-PAC 6 Clicks Score (PT)  11  -MJ  --  11  -MJ       How much help from another is currently needed...    Putting on and taking off regular lower body clothing?  --  2  -AR  --    Bathing (including washing, rinsing, and drying)  --  2  -AR  --    Toileting (which includes using toilet bed pan or urinal)  --  2  -AR  --    Putting on and taking off regular upper body clothing  --  2  -AR  --    Taking care of personal grooming (such as brushing teeth)  --  3  -AR  --    Eating meals  --  3  -AR  --    AM-PAC 6 Clicks Score (OT)  --  14  -AR  --       Functional Assessment    Outcome Measure Options  AM-PAC 6 Clicks Basic Mobility (PT)  -MJ  AM-PAC 6 Clicks Daily Activity (OT)  -AR  AM-PAC 6 Clicks Basic Mobility (PT)  -      User Key  (r) = Recorded By, (t) = Taken By, (c) = Cosigned By    Initials Name Provider Type    Jennifer Castillo OT Occupational Therapist    Henrique Alfaro, PT Physical Therapist          Time Calculation:   Time Calculation- OT     Row Name 09/10/19 1507 09/10/19 1500 09/10/19 0905       Time Calculation- OT    OT Start Time  --  1500  -AR  --    Total Timed Code Minutes- OT  --  12 minute(s)  -AR  --    OT Received On  --  09/10/19  -AR  --    OT Goal Re-Cert Due Date  --  09/20/19  -AR  --       Timed Charges    83628 - Gait Training Minutes   8  -MJ  --  10  -MJ    43551 - OT Therapeutic Activity Minutes  --  12  -AR  --      User Key  (r) = Recorded By, (t) = Taken By, (c) = Cosigned By    Initials Name Provider Type    Jennifer Castillo OT Occupational Therapist    Henrique Alfaro, PT Physical Therapist        Therapy Charges for Today     Code Description Service Date Service Provider Modifiers Qty    [id number]  OT EVAL MOD COMPLEXITY 4 9/10/2019 Jennifer Odonnell OT GO 1    [id number]  OT THERAPEUTIC ACT EA 15 MIN 9/10/2019 Jennifer Odonnell OT GO 1    [id number] HC OT THER SUPP EA 15  MIN 9/10/2019 Jennifer Odonnell, OT GO 3               Jennifer Odonnell, OT  9/10/2019   social work

## 2022-10-21 ENCOUNTER — TRANSCRIPTION ENCOUNTER (OUTPATIENT)
Age: 67
End: 2022-10-21

## 2022-10-21 VITALS
RESPIRATION RATE: 18 BRPM | TEMPERATURE: 97 F | HEART RATE: 61 BPM | OXYGEN SATURATION: 97 % | SYSTOLIC BLOOD PRESSURE: 119 MMHG | DIASTOLIC BLOOD PRESSURE: 74 MMHG

## 2022-10-21 LAB
A1C WITH ESTIMATED AVERAGE GLUCOSE RESULT: 5.8 % — HIGH (ref 4–5.6)
ADD ON TEST-SPECIMEN IN LAB: SIGNIFICANT CHANGE UP
ANION GAP SERPL CALC-SCNC: 8 MMOL/L — SIGNIFICANT CHANGE UP (ref 5–17)
APTT BLD: 51.8 SEC — HIGH (ref 27.5–35.5)
BUN SERPL-MCNC: 16 MG/DL — SIGNIFICANT CHANGE UP (ref 7–23)
CALCIUM SERPL-MCNC: 9.4 MG/DL — SIGNIFICANT CHANGE UP (ref 8.5–10.1)
CHLORIDE SERPL-SCNC: 105 MMOL/L — SIGNIFICANT CHANGE UP (ref 96–108)
CHOLEST SERPL-MCNC: 154 MG/DL — SIGNIFICANT CHANGE UP
CO2 SERPL-SCNC: 27 MMOL/L — SIGNIFICANT CHANGE UP (ref 22–31)
CREAT SERPL-MCNC: 1.2 MG/DL — SIGNIFICANT CHANGE UP (ref 0.5–1.3)
EGFR: 49 ML/MIN/1.73M2 — LOW
ESTIMATED AVERAGE GLUCOSE: 120 MG/DL — HIGH (ref 68–114)
GLUCOSE SERPL-MCNC: 96 MG/DL — SIGNIFICANT CHANGE UP (ref 70–99)
HCT VFR BLD CALC: 43.5 % — SIGNIFICANT CHANGE UP (ref 34.5–45)
HDLC SERPL-MCNC: 44 MG/DL — LOW
HGB BLD-MCNC: 13.9 G/DL — SIGNIFICANT CHANGE UP (ref 11.5–15.5)
INR BLD: 2.59 RATIO — HIGH (ref 0.88–1.16)
LIPID PNL WITH DIRECT LDL SERPL: 85 MG/DL — SIGNIFICANT CHANGE UP
MCHC RBC-ENTMCNC: 28.8 PG — SIGNIFICANT CHANGE UP (ref 27–34)
MCHC RBC-ENTMCNC: 32 GM/DL — SIGNIFICANT CHANGE UP (ref 32–36)
MCV RBC AUTO: 90.1 FL — SIGNIFICANT CHANGE UP (ref 80–100)
NON HDL CHOLESTEROL: 110 MG/DL — SIGNIFICANT CHANGE UP
PLATELET # BLD AUTO: 176 K/UL — SIGNIFICANT CHANGE UP (ref 150–400)
POTASSIUM SERPL-MCNC: 3.4 MMOL/L — LOW (ref 3.5–5.3)
POTASSIUM SERPL-SCNC: 3.4 MMOL/L — LOW (ref 3.5–5.3)
PROTHROM AB SERPL-ACNC: 30.3 SEC — HIGH (ref 10.5–13.4)
RBC # BLD: 4.83 M/UL — SIGNIFICANT CHANGE UP (ref 3.8–5.2)
RBC # FLD: 13.4 % — SIGNIFICANT CHANGE UP (ref 10.3–14.5)
SODIUM SERPL-SCNC: 140 MMOL/L — SIGNIFICANT CHANGE UP (ref 135–145)
T3 SERPL-MCNC: 116 NG/DL — SIGNIFICANT CHANGE UP (ref 80–200)
T4 AB SER-ACNC: 6.9 UG/DL — SIGNIFICANT CHANGE UP (ref 4.6–12)
TRIGL SERPL-MCNC: 126 MG/DL — SIGNIFICANT CHANGE UP
TROPONIN I, HIGH SENSITIVITY RESULT: 6.21 NG/L — SIGNIFICANT CHANGE UP
TSH SERPL-MCNC: 1.78 UU/ML — SIGNIFICANT CHANGE UP (ref 0.34–4.82)
WBC # BLD: 9.3 K/UL — SIGNIFICANT CHANGE UP (ref 3.8–10.5)
WBC # FLD AUTO: 9.3 K/UL — SIGNIFICANT CHANGE UP (ref 3.8–10.5)

## 2022-10-21 PROCEDURE — 93306 TTE W/DOPPLER COMPLETE: CPT | Mod: 26

## 2022-10-21 PROCEDURE — 99239 HOSP IP/OBS DSCHRG MGMT >30: CPT

## 2022-10-21 PROCEDURE — 99232 SBSQ HOSP IP/OBS MODERATE 35: CPT

## 2022-10-21 PROCEDURE — 70551 MRI BRAIN STEM W/O DYE: CPT | Mod: 26

## 2022-10-21 PROCEDURE — 99222 1ST HOSP IP/OBS MODERATE 55: CPT

## 2022-10-21 RX ORDER — RIVAROXABAN 15 MG-20MG
1 KIT ORAL
Qty: 30 | Refills: 0
Start: 2022-10-21 | End: 2022-11-19

## 2022-10-21 RX ORDER — RIVAROXABAN 15 MG-20MG
15 KIT ORAL
Refills: 0 | Status: DISCONTINUED | OUTPATIENT
Start: 2022-10-21 | End: 2022-10-21

## 2022-10-21 RX ORDER — ASPIRIN/CALCIUM CARB/MAGNESIUM 324 MG
1 TABLET ORAL
Qty: 0 | Refills: 0 | DISCHARGE

## 2022-10-21 RX ORDER — ATORVASTATIN CALCIUM 80 MG/1
1 TABLET, FILM COATED ORAL
Qty: 30 | Refills: 0
Start: 2022-10-21 | End: 2022-11-19

## 2022-10-21 RX ORDER — ACETAMINOPHEN 500 MG
2 TABLET ORAL
Qty: 0 | Refills: 0 | DISCHARGE

## 2022-10-21 RX ADMIN — CLOPIDOGREL BISULFATE 75 MILLIGRAM(S): 75 TABLET, FILM COATED ORAL at 09:39

## 2022-10-21 RX ADMIN — Medication 1000 UNIT(S): at 09:39

## 2022-10-21 RX ADMIN — ANASTROZOLE 1 MILLIGRAM(S): 1 TABLET ORAL at 09:39

## 2022-10-21 RX ADMIN — Medication 81 MILLIGRAM(S): at 09:39

## 2022-10-21 RX ADMIN — AMLODIPINE BESYLATE 5 MILLIGRAM(S): 2.5 TABLET ORAL at 09:38

## 2022-10-21 RX ADMIN — RIVAROXABAN 20 MILLIGRAM(S): KIT at 17:08

## 2022-10-21 RX ADMIN — Medication 12.5 MILLIGRAM(S): at 09:38

## 2022-10-21 RX ADMIN — Medication 120 MILLIGRAM(S): at 09:41

## 2022-10-21 NOTE — DISCHARGE NOTE NURSING/CASE MANAGEMENT/SOCIAL WORK - PATIENT PORTAL LINK FT
You can access the FollowMyHealth Patient Portal offered by [facility] by registering at the following website: [email or web address]. By joining [facility]’s FollowMyHealth portal, you will also be able to view your health information using other applications (apps) compatible with our system.

## 2022-10-21 NOTE — PHYSICAL THERAPY INITIAL EVALUATION ADULT - HEALTH SCREEN CRITERIA
Detail Level: Detailed Depth Of Biopsy: dermis Was A Bandage Applied: Yes yes Size Of Lesion In Cm: 0.5 X Size Of Lesion In Cm: 0 Biopsy Type: H and E Biopsy Method: Dermablade Hemostasis: Electrocautery Wound Care: Aquaphor Dressing: no dressing applied Destruction After The Procedure: No Type Of Destruction Used: Curettage Cryotherapy Text: The wound bed was treated with cryotherapy after the biopsy was performed. Electrodesiccation Text: The wound bed was treated with electrodesiccation after the biopsy was performed. Electrodesiccation And Curettage Text: The wound bed was treated with electrodesiccation and curettage after the biopsy was performed. Silver Nitrate Text: The wound bed was treated with silver nitrate after the biopsy was performed. Lab: [id number] Lab Facility: [id number] Consent: Written consent was obtained and risks were reviewed including but not limited to scarring, infection, bleeding, scabbing, incomplete removal, nerve damage and allergy to anesthesia. Post-Care Instructions: I reviewed with the patient in detail post-care instructions. Patient is to keep the biopsy site dry overnight, and then apply bacitracin twice daily until healed. Patient may apply hydrogen peroxide soaks to remove any crusting. Notification Instructions: Patient will be notified of biopsy results. However, patient instructed to call the office if not contacted within 2 weeks. Billing Type: Third-Party Bill Information: Selecting Yes will display possible errors in your note based on the variables you have selected. This validation is only offered as a suggestion for you. PLEASE NOTE THAT THE VALIDATION TEXT WILL BE REMOVED WHEN YOU FINALIZE YOUR NOTE. IF YOU WANT TO FAX A PRELIMINARY NOTE YOU WILL NEED TO TOGGLE THIS TO 'NO' IF YOU DO NOT WANT IT IN YOUR FAXED NOTE.

## 2022-10-21 NOTE — DISCHARGE NOTE PROVIDER - NSDCCPTREATMENT_GEN_ALL_CORE_FT
PRINCIPAL PROCEDURE  Procedure: 2D echo  Findings and Treatment: The left ventricle is normal in size, wall thickness, wall motion and   contractility.   Estimated left ventricular ejection fraction is 60-65 %.   The left atrium is mildly dilated.   Mild aortic regurgitation.   Mild tricuspid valve regurgitation.

## 2022-10-21 NOTE — PROGRESS NOTE ADULT - SUBJECTIVE AND OBJECTIVE BOX
Pt has no new complaints, speech little slurred, no motor weakness    MRI brain reveals small areas of restricted diffusion within the cortex of the left insular region and left lateral parietal cortex c/w small acute infarcts in left MCA territory. There is   a very small focus of hemorrhagic blood products within the left insular cortex infarct.  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    ROS: As above, other ROS Negative    MEDICATIONS  (STANDING):  amLODIPine   Tablet 5 milliGRAM(s) Oral daily  anastrozole 1 milliGRAM(s) Oral daily  aspirin enteric coated 81 milliGRAM(s) Oral daily  atorvastatin 40 milliGRAM(s) Oral at bedtime  cholecalciferol 1000 Unit(s) Oral daily  hydrochlorothiazide 12.5 milliGRAM(s) Oral daily  influenza  Vaccine (HIGH DOSE) 0.7 milliLiter(s) IntraMuscular once  lactated ringers. 1000 milliLiter(s) (100 mL/Hr) IV Continuous <Continuous>  rivaroxaban 20 milliGRAM(s) Oral with dinner  sotalol 120 milliGRAM(s) Oral every 12 hours      Vital Signs Last 24 Hrs  T(C): 36.2 (21 Oct 2022 16:30), Max: 37.4 (20 Oct 2022 22:45)  T(F): 97.2 (21 Oct 2022 16:30), Max: 99.4 (20 Oct 2022 22:45)  HR: 61 (21 Oct 2022 16:30) (58 - 64)  BP: 119/74 (21 Oct 2022 16:30) (97/54 - 152/88)  BP(mean): 66 (20 Oct 2022 21:45) (66 - 69)  RR: 18 (21 Oct 2022 16:30) (17 - 18)  SpO2: 97% (21 Oct 2022 16:30) (96% - 97%)    Parameters below as of 21 Oct 2022 16:30  Patient On (Oxygen Delivery Method): room air    Gen exam:  Normocephalic, in no distress, awake and alert.  HEENT: PERRLA, EOMI,     Neurological exam:  HF: A x O x 3. Appropriately interactive, normal affect. Mild dysarthria, No Aphasia or paraphasic errors. Naming /repetition intact   CN: BJ, EOMI, VFF, facial sensation normal, right NLFD, tongue midline, Palate moves equally, SCM equal bilaterally  Motor: No pronator drift, Strength 5/5 in all 4 ext, normal bulk and tone, no tremor, rigidity.    Sens: Intact to light touch    Reflexes: Symmetric and normal, downgoing toes b/l  Coord:  No FNFA, dysmetria, ZACHARY intact   Gait/Balance: Normal test    NIHSS: 2                            13.9   9.30  )-----------( 176      ( 21 Oct 2022 07:50 )             43.5     10-21    140  |  105  |  16  ----------------------------<  96  3.4<L>   |  27  |  1.20    Ca    9.4      21 Oct 2022 07:50  Mg     2.1     10-21    TPro  7.9  /  Alb  3.7  /  TBili  1.0  /  DBili  x   /  AST  19  /  ALT  23  /  AlkPhos  109  10-20      10-21 Chol 154 LDL -- HDL 44<L> Trig 126    Radiology report:  - < from: MR Head No Cont (10.21.22 @ 16:19) >    2 small areas of restricted diffusion within the cortex of the left   insular region and the cortex of the left lateral parietal lobe,   compatible with small acute infarcts in the left MCA territory. There is   a very small focus of hemorrhagic blood products within the left insular   cortex infarct.    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    < from: CT Angio Brain Stroke Protocol  w/ IV Cont (10.20.22 @ 15:41) >  CTA brain and neck:  1.  No proximal large vessel arterial occlusion, flow-limiting stenosis or dissection.  2.  Severe compression fracture of C5 vertebral body, age indeterminate   but possibly subacute to chronic in nature. Acute fracture however cannot   be excluded. Correlate with point tenderness and consider further   evaluation with MRI if there is high clinical suspicion for traumatic   injury.  3.  Right thyroid nodule (3.6 cm), for which dedicated thyroid ultrasound   evaluation is recommended.    CT perfusion:  4. No acute core ischemic infarct or critically hypoperfused tissue at   risk. If there is continued clinical suspicion for evolving acute   cerebral ischemia, consider further evaluation with MRI/MRA.

## 2022-10-21 NOTE — DISCHARGE NOTE PROVIDER - NSDCCPCAREPLAN_GEN_ALL_CORE_FT
PRINCIPAL DISCHARGE DIAGNOSIS  Diagnosis: Transient ischemic attack  Assessment and Plan of Treatment: WHAT IS A TRANSIENT ISCHEMIC ATTACK? A transient ischemic attack (TIA) or mini stroke occurs when blood cannot flow to part of the brain.  THINGS TO DO: (1) Manage your health conditions like diabetes or hypertension (2) Monitor your blood pressure (3) Avoid nicotine products – smoking can cause damage to your blood vessels and increase your risk for a stroke (4) Maintain a healthy weight (5) Stay active with exercise (6) Limit or avoid alcohol intake – alcohol can raise your blood pressure (7) Eat heart healthy foods like fruits, vegetables, and whole grains  MONITOR THESE SIGNS AND SYMPTOMS: (1) Numbness or drooping of one side of your face (2) Weakness of your arm or leg (3) Confusion or difficulty speaking /slurred speech. If you experience any of these, DO alert your primary care provider, or return to the Emergency Department if you feel very sick.      SECONDARY DISCHARGE DIAGNOSES  Diagnosis: Hypertensive emergency  Assessment and Plan of Treatment: #Atrial fibrillation  - it is extremely important for you to maintain good complience to medications (XARELTO)  -[facility]: 6, xarelto reinstated, had self discontinued years ago due to cost  WHAT IS ATRIAL FIBRILLATION? Atrial fibrillation (AFIB) is a cardiac arrhythmia caused by a disorder in the hearts electrical system. An arrhythmia is a problem with the speed or rhythm of the heartbeat. Atrial fibrillation is the most common type of arrhythmia.  THINGS TO DO: (1) Monitor your blood pressure and heart rate (2) Limit or avoid alcohol intake – alcohol can increase your risk of AFIB (3) Do not smoke – nicotine can damage your heart and make it difficult to manage your AFIB (4) Eat heart healthy foods like fruits, vegetables, and whole grains (5) Manage a healthy weight (5) Get regular physical activity  MONITOR THESE SIGNS AND SYMPTOMS: (1) Shortness of breath (2) Nausea or vomiting (3) Chest pain or pressure (4) Chest palpitations. If you experience any of these, DO alert your primary care provider, or return to the Emergency Department if you feel very sick.       Diagnosis: Abnormal thyroid ultrasound  Assessment and Plan of Treatment: - There is a large lobulated, partially calcified solid hypervascular   nodule identified within the mid pole region of the right thyroid lobule   measuring 4.6 x 2.6 x 2.3 cm. Consider ultrasound-guided fine-needle   aspiration for further assessment; if benign cytology results are   received six-month follow-up thyroid ultrasonography would be recommended to assess stability of the remaining thyroid nodules.  - please closely follow up with your primary care physician for thyroid studies and further investigation of abnormal nodule      Diagnosis: Compression fracture of C5 vertebra  Assessment and Plan of Treatment: - Severe compression fracture of C5 vertebral body, age indeterminate   but possibly subacute to chronic in nature. Acute fracture however cannot be excluded. Correlate with point tenderness and consider further evaluation with MRI if there is high clinical suspicion for traumatic injury.  - outpatient follow up with primary care physician    Diagnosis: Chronic atrial fibrillation  Assessment and Plan of Treatment:      PRINCIPAL DISCHARGE DIAGNOSIS  Diagnosis: Acute CVA (cerebrovascular accident)  Assessment and Plan of Treatment: - small acute infarcts in the left MCA territory -  this is most likely secondary to underlying atrial fibrillation  - it is extremely important for you to maintain good complience with xarelto  - close follow up with cardiology /  close follow up with cardiology  WHAT IS A STROKE? A stroke (CVA) occurs when blood flow to brain is interrupted causing lack of oxygen.  THINGS TO DO: (1) Manage your health conditions like diabetes or hypertension (2) Monitor your blood pressure (3) Avoid nicotine products – smoking can cause damage to your blood vessels and increase your risk for a stroke (4) Maintain a healthy weight (5) Stay active with exercise (6) Limit or avoid alcohol intake – alcohol can raise your blood pressure (7) Eat heart healthy foods like fruits, vegetables, and whole grains  MONITOR THESE SIGNS AND SYMPTOMS: (1) Loss of balance or confusion (2) Double vision or vision loss (3) Chest pain or shortness of breath (4) Trouble swallowing. If you experience any of these, DO alert your primary care provider, or return to the Emergency Department if you feel very sick.      SECONDARY DISCHARGE DIAGNOSES  Diagnosis: Hypertensive emergency  Assessment and Plan of Treatment: #Atrial fibrillation  - it is extremely important for you to maintain good complience to medications (XARELTO)  [facility]to [facility], had self discontinued years ago due to cost  WHAT IS ATRIAL FIBRILLATION? Atrial fibrillation (AFIB) is a cardiac arrhythmia caused by a disorder in the hearts electrical system. An arrhythmia is a problem with the speed or rhythm of the heartbeat. Atrial fibrillation is the most common type of arrhythmia.  THINGS TO DO: (1) Monitor your blood pressure and heart rate (2) Limit or avoid alcohol intake – alcohol can increase your risk of AFIB (3) Do not smoke – nicotine can damage your heart and make it difficult to manage your AFIB (4) Eat heart healthy foods like fruits, vegetables, and whole grains (5) Manage a healthy weight (5) Get regular physical activity  MONITOR THESE SIGNS AND SYMPTOMS: (1) Shortness of breath (2) Nausea or vomiting (3) Chest pain or pressure (4) Chest palpitations. If you experience any of these, DO alert your primary care provider, or return to the Emergency Department if you feel very sick.       Diagnosis: Abnormal thyroid ultrasound  Assessment and Plan of Treatment: - There is a large lobulated, partially calcified solid hypervascular   nodule identified within the mid pole region of the right thyroid lobule   measuring 4.6 x 2.6 x 2.3 cm. Consider ultrasound-guided fine-needle   aspiration for further assessment; if benign cytology results are   received six-month follow-up thyroid ultrasonography would be recommended to assess stability of the remaining thyroid nodules.  - please closely follow up with your primary care physician for thyroid studies and further investigation of abnormal nodule      Diagnosis: Compression fracture of C5 vertebra  Assessment and Plan of Treatment: - Severe compression fracture of C5 vertebral body, age indeterminate   but possibly subacute to chronic in nature. Acute fracture however cannot be excluded. Correlate with point tenderness and consider further evaluation with MRI if there is high clinical suspicion for traumatic injury.  - outpatient follow up with primary care physician    Diagnosis: Chronic atrial fibrillation  Assessment and Plan of Treatment:      PRINCIPAL DISCHARGE DIAGNOSIS  Diagnosis: Acute CVA (cerebrovascular accident)  Assessment and Plan of Treatment: - small acute infarcts in the left MCA territory -  this is most likely secondary to underlying atrial fibrillation causing clot embolism  - it is extremely important for you to maintain good complience with xarelto (DO NOT STOP MEDICATION)  - close follow up with cardiology /  close follow up with cardiology  WHAT IS A STROKE? A stroke (CVA) occurs when blood flow to brain is interrupted causing lack of oxygen.  THINGS TO DO: (1) Manage your health conditions like diabetes or hypertension (2) Monitor your blood pressure (3) Avoid nicotine products – smoking can cause damage to your blood vessels and increase your risk for a stroke (4) Maintain a healthy weight (5) Stay active with exercise (6) Limit or avoid alcohol intake – alcohol can raise your blood pressure (7) Eat heart healthy foods like fruits, vegetables, and whole grains  MONITOR THESE SIGNS AND SYMPTOMS: (1) Loss of balance or confusion (2) Double vision or vision loss (3) Chest pain or shortness of breath (4) Trouble swallowing. If you experience any of these, DO alert your primary care provider, or return to the Emergency Department if you feel very sick.      SECONDARY DISCHARGE DIAGNOSES  Diagnosis: Hypertensive emergency  Assessment and Plan of Treatment: #Atrial fibrillation  - it is extremely important for you to maintain good complience to medications (XARELTO)  [facility], xarelto reinstated, had self discontinued years ago due to cost  WHAT IS ATRIAL FIBRILLATION? Atrial fibrillation (AFIB) is a cardiac arrhythmia caused by a disorder in the hearts electrical system. An arrhythmia is a problem with the speed or rhythm of the heartbeat. Atrial fibrillation is the most common type of arrhythmia.  THINGS TO DO: (1) Monitor your blood pressure and heart rate (2) Limit or avoid alcohol intake – alcohol can increase your risk of AFIB (3) Do not smoke – nicotine can damage your heart and make it difficult to manage your AFIB (4) Eat heart healthy foods like fruits, vegetables, and whole grains (5) Manage a healthy weight (5) Get regular physical activity  MONITOR THESE SIGNS AND SYMPTOMS: (1) Shortness of breath (2) Nausea or vomiting (3) Chest pain or pressure (4) Chest palpitations. If you experience any of these, DO alert your primary care provider, or return to the Emergency Department if you feel very sick.       Diagnosis: Abnormal thyroid ultrasound  Assessment and Plan of Treatment: - There is a large lobulated, partially calcified solid hypervascular   nodule identified within the mid pole region of the right thyroid lobule   measuring 4.6 x 2.6 x 2.3 cm. Consider ultrasound-guided fine-needle   aspiration for further assessment; if benign cytology results are   received six-month follow-up thyroid ultrasonography would be recommended to assess stability of the remaining thyroid nodules.  - please closely follow up with your primary care physician for thyroid studies and further investigation of abnormal nodule      Diagnosis: Compression fracture of C5 vertebra  Assessment and Plan of Treatment: - Severe compression fracture of C5 vertebral body, age indeterminate   but possibly subacute to chronic in nature. Acute fracture however cannot be excluded. Correlate with point tenderness and consider further evaluation with MRI if there is high clinical suspicion for traumatic injury.  - outpatient follow up with primary care physician    Diagnosis: Chronic atrial fibrillation  Assessment and Plan of Treatment:      PRINCIPAL DISCHARGE DIAGNOSIS  Diagnosis: Acute CVA (cerebrovascular accident)  Assessment and Plan of Treatment: - small acute infarcts in the left MCA territory -  this is most likely secondary to underlying atrial fibrillation causing clot embolism  - it is extremely important for you to maintain good complience with xarelto (DO NOT STOP MEDICATION)  - close follow up with cardiology /  close follow up with cardiology  WHAT IS A STROKE? A stroke (CVA) occurs when blood flow to brain is interrupted causing lack of oxygen.  THINGS TO DO: (1) Manage your health conditions like diabetes or hypertension (2) Monitor your blood pressure (3) Avoid nicotine products – smoking can cause damage to your blood vessels and increase your risk for a stroke (4) Maintain a healthy weight (5) Stay active with exercise (6) Limit or avoid alcohol intake – alcohol can raise your blood pressure (7) Eat heart healthy foods like fruits, vegetables, and whole grains  MONITOR THESE SIGNS AND SYMPTOMS: (1) Loss of balance or confusion (2) Double vision or vision loss (3) Chest pain or shortness of breath (4) Trouble swallowing. If you experience any of these, DO alert your primary care provider, or return to the Emergency Department if you feel very sick.  - continue with Aspirin /  STATIN      SECONDARY DISCHARGE DIAGNOSES  Diagnosis: Hypertensive emergency  Assessment and Plan of Treatment: #Atrial fibrillation  - it is extremely important for you to maintain good complience to medications (XARELTO)  [facility], xarelto reinst[facility], had self discontinued years ago due to cost  WHAT IS ATRIAL FIBRILLATION? Atrial fibrillation (AFIB) is a cardiac arrhythmia caused by a disorder in the hearts electrical system. An arrhythmia is a problem with the speed or rhythm of the heartbeat. Atrial fibrillation is the most common type of arrhythmia.  THINGS TO DO: (1) Monitor your blood pressure and heart rate (2) Limit or avoid alcohol intake – alcohol can increase your risk of AFIB (3) Do not smoke – nicotine can damage your heart and make it difficult to manage your AFIB (4) Eat heart healthy foods like fruits, vegetables, and whole grains (5) Manage a healthy weight (5) Get regular physical activity  MONITOR THESE SIGNS AND SYMPTOMS: (1) Shortness of breath (2) Nausea or vomiting (3) Chest pain or pressure (4) Chest palpitations. If you experience any of these, DO alert your primary care provider, or return to the Emergency Department if you feel very sick.       Diagnosis: Abnormal thyroid ultrasound  Assessment and Plan of Treatment: - There is a large lobulated, partially calcified solid hypervascular   nodule identified within the mid pole region of the right thyroid lobule   measuring 4.6 x 2.6 x 2.3 cm. Consider ultrasound-guided fine-needle   aspiration for further assessment; if benign cytology results are   received six-month follow-up thyroid ultrasonography would be recommended to assess stability of the remaining thyroid nodules.  - please closely follow up with your primary care physician for thyroid studies and further investigation of abnormal nodule      Diagnosis: Compression fracture of C5 vertebra  Assessment and Plan of Treatment: - Severe compression fracture of C5 vertebral body, age indeterminate   but possibly subacute to chronic in nature. Acute fracture however cannot be excluded. Correlate with point tenderness and consider further evaluation with MRI if there is high clinical suspicion for traumatic injury.  - outpatient follow up with primary care physician    Diagnosis: Chronic atrial fibrillation  Assessment and Plan of Treatment: WHAT IS ATRIAL FIBRILLATION? Atrial fibrillation (AFIB) is a cardiac arrhythmia caused by a disorder in the hearts electrical system. An arrhythmia is a problem with the speed or rhythm of the heartbeat. Atrial fibrillation is the most common type of arrhythmia.  THINGS TO DO: (1) Monitor your blood pressure and heart rate (2) Limit or avoid alcohol intake – alcohol can increase your risk of AFIB (3) Do not smoke – nicotine can damage your heart and make it difficult to manage your AFIB (4) Eat heart healthy foods like fruits, vegetables, and whole grains (5) Manage a healthy weight (5) Get regular physical activity  MONITOR THESE SIGNS AND SYMPTOMS: (1) Shortness of breath (2) Nausea or vomiting (3) Chest pain or pressure (4) Chest palpitations. If you experience any of these, DO alert your primary care provider, or return to the Emergency Department if you feel very sick.  - you have a very high risk of stroke  - maintain good complience with xarelto       PRINCIPAL DISCHARGE DIAGNOSIS  Diagnosis: Acute CVA (cerebrovascular accident)  Assessment and Plan of Treatment: - small acute infarcts in the left MCA territory -  this is most likely secondary to underlying atrial fibrillation causing clot embolism  - it is extremely important for you to maintain good complience with xarelto (DO NOT STOP MEDICATION)  - close follow up with cardiology /  close follow up with cardiology  WHAT IS A STROKE? A stroke (CVA) occurs when blood flow to brain is interrupted causing lack of oxygen.  THINGS TO DO: (1) Manage your health conditions like diabetes or hypertension (2) Monitor your blood pressure (3) Avoid nicotine products – smoking can cause damage to your blood vessels and increase your risk for a stroke (4) Maintain a healthy weight (5) Stay active with exercise (6) Limit or avoid alcohol intake – alcohol can raise your blood pressure (7) Eat heart healthy foods like fruits, vegetables, and whole grains  MONITOR THESE SIGNS AND SYMPTOMS: (1) Loss of balance or confusion (2) Double vision or vision loss (3) Chest pain or shortness of breath (4) Trouble swallowing. If you experience any of these, DO alert your primary care provider, or return to the Emergency Department if you feel very sick.  - c/w  STATIN      SECONDARY DISCHARGE DIAGNOSES  Diagnosis: Hypertensive emergency  Assessment and Plan of Treatment: #Atrial fibrillation  - it is extremely important for you to maintain good complience to medications (XARELTO)  [facility], xarelto reinstated, had self discontinued years ago due to cost  WHAT IS ATRIAL FIBRILLATION? Atrial fibrillation (AFIB) is a cardiac arrhythmia caused by a disorder in the hearts electrical system. An arrhythmia is a problem with the speed or rhythm of the heartbeat. Atrial fibrillation is the most common type of arrhythmia.  THINGS TO DO: (1) Monitor your blood pressure and heart rate (2) Limit or avoid alcohol intake – alcohol can increase your risk of AFIB (3) Do not smoke – nicotine can damage your heart and make it difficult to manage your AFIB (4) Eat heart healthy foods like fruits, vegetables, and whole grains (5) Manage a healthy weight (5) Get regular physical activity  MONITOR THESE SIGNS AND SYMPTOMS: (1) Shortness of breath (2) Nausea or vomiting (3) Chest pain or pressure (4) Chest palpitations. If you experience any of these, DO alert your primary care provider, or return to the Emergency Department if you feel very sick.       Diagnosis: Abnormal thyroid ultrasound  Assessment and Plan of Treatment: - There is a large lobulated, partially calcified solid hypervascular   nodule identified within the mid pole region of the right thyroid lobule   measuring 4.6 x 2.6 x 2.3 cm. Consider ultrasound-guided fine-needle   aspiration for further assessment; if benign cytology results are   received six-month follow-up thyroid ultrasonography would be recommended to assess stability of the remaining thyroid nodules.  - please closely follow up with your primary care physician for thyroid studies and further investigation of abnormal nodule      Diagnosis: Compression fracture of C5 vertebra  Assessment and Plan of Treatment: - Severe compression fracture of C5 vertebral body, age indeterminate   but possibly subacute to chronic in nature. Acute fracture however cannot be excluded. Correlate with point tenderness and consider further evaluation with MRI if there is high clinical suspicion for traumatic injury.  - outpatient follow up with primary care physician    Diagnosis: Chronic atrial fibrillation  Assessment and Plan of Treatment: WHAT IS ATRIAL FIBRILLATION? Atrial fibrillation (AFIB) is a cardiac arrhythmia caused by a disorder in the hearts electrical system. An arrhythmia is a problem with the speed or rhythm of the heartbeat. Atrial fibrillation is the most common type of arrhythmia.  THINGS TO DO: (1) Monitor your blood pressure and heart rate (2) Limit or avoid alcohol intake – alcohol can increase your risk of AFIB (3) Do not smoke – nicotine can damage your heart and make it difficult to manage your AFIB (4) Eat heart healthy foods like fruits, vegetables, and whole grains (5) Manage a healthy weight (5) Get regular physical activity  MONITOR THESE SIGNS AND SYMPTOMS: (1) Shortness of breath (2) Nausea or vomiting (3) Chest pain or pressure (4) Chest palpitations. If you experience any of these, DO alert your primary care provider, or return to the Emergency Department if you feel very sick.  - you have a very high risk of stroke  - maintain good complience with xarelto

## 2022-10-21 NOTE — SWALLOW BEDSIDE ASSESSMENT ADULT - SWALLOW EVAL: RECOMMENDED DIET
SUGGEST A REGULAR TEXTURE DIET WITH THIN LIQUID CONSISTENCIES AS THE PATIENT APPEARED CLINICALLY TOLERANT OF SUGGESTED FOOD CONSISTENCIES FROM AN OROPHARYNGEAL SWALLOWING STANCE ON CLINICAL EXAM.

## 2022-10-21 NOTE — CONSULT NOTE ADULT - ASSESSMENT
66yo female with PMH HTN, HLD, Breast ca, TIA, PAF on sotalol 120mg bid and was on xarelto for oac,  presented with elevated BP and changed in mental status, admitted with TIA, symptoms now resolved. Pt has been on sotalol for the last few years, since alf 2 years ago and insurance changes she stopped taking xarelto. She reports that she is usually symptomatic and aware of her afib, and reports having occasional brief episodes that go aware with rest.  EPS consulted for bradycardia. Telemetry reviewed average rates in mid 50s with occasional celia to upper 40s, in sinus rhythm, no episodes of pauses of any marked bradycardia, no NSVT noted.  Pt states her heart rates always in 50s and she denies symptoms of fatigue,  sob, dizziness or any exercise intolerance. EKG reviewed QTc stable and measures 470ms, 1st degree AVB with Nelli 216ms.    Would continue current dose sotalol and avoid any other av-devorah blockers.   High chadsvasc score 6 - re-instated oac with xarelto.  Discussed in details the indication for oral anticoagulation given PAF and recurrent TIA, alternatives to doac was discussed and patient does not want to commit to coumadin INR/monitoring and she agrees to stay on xarelto.       68yo female with PMH HTN, HLD, Breast ca, TIA, PAF on sotalol 120mg bid and was on xarelto for oac,  presented with elevated BP and changed in mental status, admitted with TIA, symptoms now resolved. Pt has been on sotalol for the last few years, since care home 2 years ago and insurance changes she stopped taking xarelto. She reports that she is usually symptomatic and aware of her afib, and reports having occasional brief episodes that go aware with rest.  EPS consulted for bradycardia. Telemetry reviewed average rates in mid 50s with occasional celia to upper 40s, in sinus rhythm, no episodes of pauses of any marked bradycardia, no NSVT noted.  Pt states her heart rates always in 50s and she denies symptoms of fatigue,  sob, dizziness or any exercise intolerance. EKG reviewed QTc stable and measures 470ms, 1st degree AVB with Nelli 216ms.    Would continue current dose sotalol and avoid any other av-devorah blockers.   High chadsvasc score 6 - re-instated oac with xarelto.  Discussed in details the indication for oral anticoagulation given PAF and recurrent TIA, alternatives to doac was discussed and patient does not want to commit to coumadin INR/monitoring and she agrees to stay on xarelto.   keep K>4, Mg>2  further management and dispo per medicine

## 2022-10-21 NOTE — SWALLOW BEDSIDE ASSESSMENT ADULT - SWALLOW EVAL: CRITERIA FOR SKILLED INTERVENTION MET
DO NOT FEEL THAT ACUTE SPEECH PATHOLOGY FOLLOW UP WOULD CHANGE CLINICAL MANAGEMENT/OUTCOME WHILE IN HOSPITAL. PT'S SPEECH-LANGUAGE ABILITIES AND OROPHARYNGEAL SWALLOWING ABILITIES ARE FUNCTIONAL/AT USUAL STATE. GIVEN ABOVE, THIS SERVICE WILL NOT ACTIVELY FOLLOW. RECONSULT PRN SHOULD STATUS CHANGE AND CONDITION WARRANT.
Acute medical problem

## 2022-10-21 NOTE — SWALLOW BEDSIDE ASSESSMENT ADULT - SWALLOW EVAL: DIAGNOSIS
1) Pt exhibits functional Oropharyngeal Swallowing abilities for age. NO behavioral aspiration signs exhibited. Odynophagia denied.  2) The pt was alert and interactive. Her receptive language abilities were preserved and she was oriented x3. Pt was also able to verbalize during communicative probes without evidence of a primary motor speech or primary linguistic pathology. Pt is able to verbalize needs and feels that communicative integrity is at usual state.

## 2022-10-21 NOTE — SWALLOW BEDSIDE ASSESSMENT ADULT - SLP GENERAL OBSERVATIONS
The pt was alert and interactive. Her receptive language abilities were preserved and she was oriented x3. Pt was also able to verbalize during communicative probes without evidence of a primary motor speech or primary linguistic pathology. Pt is able to verbalize needs and feels that communicative integrity is at usual state.

## 2022-10-21 NOTE — CONSULT NOTE ADULT - ASSESSMENT
Pt is a 68 yo female with a pmh/o breast ca on anastrazole, afib who is non compliant with xarelto due to cost for "few years", HTN, bradycardia, TIA/CVA, admitted to the hospital with a stroke.     10/21/22  would restart Xarelto 20 mg daily.  will need to follow up with endocrinology for the thyroid nodule.   on sotalol and hctz which will help control the blood pressure. will follow up as an outpatient.

## 2022-10-21 NOTE — CONSULT NOTE ADULT - SUBJECTIVE AND OBJECTIVE BOX
Pt is a 66 yo female with a pmh/o breast ca on anastrazole, afib who is non compliant with xarelto due to cost for "few years", HTN, bradycardia, TIA/CVA, who presents to ED today after friend noticed her speech not being baseline this afternoon. Pt states symptoms first started last night while watching TV. Was sitting and noticed she could not speak or understand how to use her everyday devices such as cell phone, DVR etc. Pt states she also had a headache and so took BP and SBP was >200. At that time she took tylenol with relief and 3 norvasc which did not improve her sx or BP. Speech later became garbled however "mouth not functioning". Could not write or remember  or name. Pt states symptoms are similar to last CVA/TIA many years ago. States event started around 9 or 10 pm and gradually improved and so went to bed. This afternoon, friend called and noticed change in her pronunciation of words and brought her to ED. Code stroke called on arrival.  (20 Oct 2022 20:08)    10/21/22  Patient well known to me.   was last seen as an outpatient in 2022.   she had decided she wasn't going to continue taking anticoagulation anymore, mostly because of cost. She had a history of CVA as a result of paroxysmal Afib.   today, she has decided to go back on medications.         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      S/P breast lumpectomy  2017        MEDICATIONS  (STANDING):  amLODIPine   Tablet 5 milliGRAM(s) Oral daily  anastrozole 1 milliGRAM(s) Oral daily  aspirin enteric coated 81 milliGRAM(s) Oral daily  atorvastatin 40 milliGRAM(s) Oral at bedtime  cholecalciferol 1000 Unit(s) Oral daily  hydrochlorothiazide 12.5 milliGRAM(s) Oral daily  influenza  Vaccine (HIGH DOSE) 0.7 milliLiter(s) IntraMuscular once  lactated ringers. 1000 milliLiter(s) (100 mL/Hr) IV Continuous <Continuous>  rivaroxaban 20 milliGRAM(s) Oral with dinner  sotalol 120 milliGRAM(s) Oral every 12 hours    MEDICATIONS  (PRN):  acetaminophen     Tablet .. 650 milliGRAM(s) Oral every 6 hours PRN Temp greater or equal to 38C (100.4F), Mild Pain (1 - 3), Moderate Pain (4 - 6)    Allergies    No Known Allergies    Intolerances      FAMILY HISTORY:  FH: stroke (Father)  at 60 years old, cause of death    FH: HTN (hypertension) (Mother)          REVIEW OF SYSTEMS:    CONSTITUTIONAL: No weakness, fevers or chills  EYES/ENT: No visual changes;  No vertigo or throat pain   NECK: No pain or stiffness  RESPIRATORY: No cough, wheezing, hemoptysis; No shortness of breath  CARDIOVASCULAR: No chest pain or palpitations  GASTROINTESTINAL: No abdominal or epigastric pain. No nausea, vomiting, or hematemesis; No diarrhea or constipation. No melena or hematochezia.  GENITOURINARY: No dysuria, frequency or hematuria  NEUROLOGICAL: No numbness or weakness  SKIN: No itching, burning, rashes, or lesions   All other review of systems is negative unless indicated above      PHYSICAL EXAM:  Daily Height in cm: 154.94 (20 Oct 2022 22:45)    Daily   Vital Signs Last 24 Hrs  T(C): 36.2 (21 Oct 2022 16:30), Max: 37.4 (20 Oct 2022 22:45)  T(F): 97.2 (21 Oct 2022 16:30), Max: 99.4 (20 Oct 2022 22:45)  HR: 61 (21 Oct 2022 16:30) (58 - 64)  BP: 119/74 (21 Oct 2022 16:30) (97/54 - 152/88)  BP(mean): 66 (20 Oct 2022 21:45) (66 - 69)  RR: 18 (21 Oct 2022 16:30) (17 - 18)  SpO2: 97% (21 Oct 2022 16:30) (96% - 97%)    Parameters below as of 21 Oct 2022 16:30  Patient On (Oxygen Delivery Method): room air        Constitutional: NAD, awake and alert, well-developed  HEENT: PERR, EOMI, Normal Hearing, MMM  Neck: Soft and supple, No LAD, No JVD  Respiratory: Breath sounds are clear bilaterally, No wheezing, rales or rhonchi  Cardiovascular: S1 and S2, regular rate and rhythm, no Murmurs, gallops or rubs  Gastrointestinal: Bowel Sounds present, soft, nontender, nondistended, no guarding, no rebound  Extremities: No peripheral edema  Vascular: 2+ peripheral pulses  Neurological: A/O x 3, no focal deficits subtle facial droop  Musculoskeletal: 5/5 strength b/l upper and lower extremities  Skin: No rashes    LABS: All Labs Reviewed:                        13.9   9.30  )-----------( 176      ( 21 Oct 2022 07:50 )             43.5     10-    140  |  105  |  16  ----------------------------<  96  3.4<L>   |  27  |  1.20    Ca    9.4      21 Oct 2022 07:50  Mg     2.1     10-    TPro  7.9  /  Alb  3.7  /  TBili  1.0  /  DBili  x   /  AST  19  /  ALT  23  /  AlkPhos  109  10-20    PT/INR - ( 21 Oct 2022 07:50 )   PT: 30.3 sec;   INR: 2.59 ratio         PTT - ( 21 Oct 2022 07:50 )  PTT:51.8 sec      Blood Culture:     RADIOLOGY: < from: MR Head No Cont (10.21.22 @ 16:19) >  ACC: [id number] EXAM:  MR BRAIN                          PROCEDURE DATE:  10/21/2022          INTERPRETATION:  Exam Date: 10/21/2022 4:19 PM    MR brain  without gadolinium    CLINICAL INFORMATION:  TIA    TECHNIQUE: Multiplanar imaging of the brain was performed.    COMPARISON: CT head 10/20/2022    FINDINGS:  There are 2 small areas of restricted diffusion within the cortex of the   left insular region and the cortex of the left lateral parietal lobe,   compatible with small acute infarcts in the left MCA territory. There is   a very small focus of hemorrhagic blood products within the left insular   cortex infarct. There is a 0.7 x 0.5 cm cavernoma within the left globus   pallidus. There is no midline shift or herniation pattern. No mass effect   is found in the brain.    The ventricles, sulci and basal cisterns appear unremarkable.    The vertebral and internal carotid arteries demonstrate expected flow   voids indicating their patency.    The paranasal sinuses are clear.    IMPRESSION:    2 small areas of restricted diffusion within the cortex of the left   insular region and the cortex of the left lateral parietal lobe,   compatible with small acute infarcts in the left MCA territory. There is   a very small focus of hemorrhagic blood products within the left insular   cortex infarct.    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    < end of copied text >  < from: US Thyroid + Parathyroid (10.20.22 @ 21:02) >    ACC: [id number] EXAM:  US THYROID AND PARATHYROID                          PROCEDURE DATE:  10/20/2022          INTERPRETATION:  CLINICAL INFORMATION: 3.6 cm right thyroid nodule noted   on CT evaluation performed 10/20/2022; follow-up assessment.    COMPARISON: No prior thyroid sonographic evaluations available for   comparison.    TECHNIQUE: Sonography of the thyroid.    FINDINGS:  Right Lobe: 5.4 cm x  2.9 cm x 2.7 cm.  There is a large lobulated, partially calcified solid hypervascular   nodule identified within the mid pole region of the right thyroid lobule   measuring 4.6 x 2.6 x 2.3 cm.    Left Lobe: 3.7 cm x 1.5 cm x 1.1 cm.  Multiple nodules of the left thyroid lobule identified.  Upper pole region: 0.4 cm part cystic/part solid nodule.  Upper-mid pole region: 0.8 cm part cystic/part solid complex nodule, the   dominant nodule of the left thyroid lobule.  Mid pole region: 0.4 cm hypoechoic solid appearing nodule.  Lower pole region: 0.5 cm hypoechoic solid-appearing nodule.    Isthmus: 9 mm. Enlargement of the right thyroid isthmic region noted.    Cervical Lymph Nodes: No enlarged or abnormal morphology cervical nodes.    IMPRESSION:  There is a large lobulated, partially calcified solid hypervascular   nodule identified within the mid pole region of the right thyroid lobule   measuring 4.6 x 2.6 x 2.3 cm. Consider ultrasound-guided fine-needle   aspiration for further assessment; if benign cytology results are   received six-month follow-up thyroid ultrasonography would be recommended   to assess stability of the remaining thyroid nodules.    < end of copied text >    EKG: NSR, Bradycardia, Septal Q waves.    CARDIOLOGY TESTING:  < from: TTE Echo Complete w/o Contrast w/ Doppler (10.21.22 @ 14:16) >     The left ventricle is normal in size, wall thickness, wall motion and   contractility.   Estimated left ventricular ejection fraction is 60-65 %.   The left atrium is mildly dilated.   Mild aortic regurgitation.   Mild tricuspid valve regurgitation.    < end of copied text >  
cc:  67y old  Female who presents with a chief complaint of transient aphasia    HPI:  68 y/o female with a PMHx of HTN, HLD, Afib on sotalolelto, CVA 4 years ago with no residual deficits presents to the ED today at 14:30 hrs s/p an episode of pounding PETER that started around 9 pm yesterday, associated with speech difficulty, could not pronounce her name, was confused could not operate the TV remote, denied associated weakness, facial droop, visual blurring, N, V, vertigo and LOC. She reports she checked her BP it was very high, she took 3 pills of Amlodipine, her symptoms HA improved, speech was back to normal within 2 hours.    Pt came to ED today, initially Code stroke called, later cancelled as pt was asymptomatic. Pt had been on xarelto two ago but self discontinued due to cost    CT head chronic lacunar infarct within left BG + chronic small vessel ischemia.  CT angio H/N - no LVO or stenosis, CTP - No core infarct.  C5 compression fracture -incidental finding.      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        Vital Signs Last 24 Hrs  T(C): 37.1 (20 Oct 2022 15:17), Max: 37.1 (20 Oct 2022 15:17)  T(F): 98.8 (20 Oct 2022 15:17), Max: 98.8 (20 Oct 2022 15:17)  HR: 60 (20 Oct 2022 15:17) (60 - 60)  BP: 132/56 (20 Oct 2022 15:17) (132/56 - 132/56)  BP(mean): --  RR: 18 (20 Oct 2022 15:17) (18 - 18)  SpO2: 100% (20 Oct 2022 15:17) (100% - 100%)    Parameters below as of 20 Oct 2022 15:17  Patient On (Oxygen Delivery Method): room air      Gen exam:  Normocephalic, in no distress, awake and alert.  HEENT: PERRLA, EOMI,   Neck: Supple.  Respiratory: Breath sounds are clear bilaterally  Cardiovascular: S1 and S2, regular  Extremities: trace LE edema  Vascular: Caritid Bruit - no  Musculoskeletal: no abnormal movements  Skin: No rashes      Neurological exam:  HF: A x O x 3. Appropriately interactive, normal affect. Speech fluent, No Aphasia or paraphasic errors. Naming /repetition intact   CN: BJ, EOMI, VFF, facial sensation normal, no NLFD, tongue midline, Palate moves equally, SCM equal bilaterally  Motor: No pronator drift, Strength 5/5 in all 4 ext, normal bulk and tone, no tremor, rigidity.    Sens: Intact to light touch    Reflexes: Symmetric and normal, downgoing toes b/l  Coord:  No FNFA, dysmetria, ZACHARY intact   Gait/Balance: Normal test    NIHSS: 0      Labs:   10-20    137  |  103  |  15  ----------------------------<  107<H>  3.7   |  27  |  1.17    Ca    9.4      20 Oct 2022 14:58    TPro  7.9  /  Alb  3.7  /  TBili  1.0  /  DBili  x   /  AST  19  /  ALT  23  /  AlkPhos  109  10-20                          14.6   9.01  )-----------( 167      ( 20 Oct 2022 14:58 )             45.2       Radiology:  < from: CT Brain Stroke Protocol (10.20.22 @ 14:44) >  1.  No CT evidence acute intracranial hemorrhage, territorial infarction   or mass effect.. If the patient has persistent symptoms and/or new focal   neurologic deficits, consider further evaluation with MRI.    2.  Prior chronic lacunar infarct within left basal ganglia. Additional   nonspecific findings, suggestive of chronic small vessel ischemia.    < from: CT Angio Brain Stroke Protocol  w/ IV Cont (10.20.22 @ 15:41) >  CTA brain and neck:  1.  No proximal large vessel arterial occlusion, flow-limiting stenosis   or dissection.  2.  Severe compression fracture of C5 vertebral body, age indeterminate   but possibly subacute to chronic in nature. Acute fracture however cannot   be excluded. Correlate with point tenderness and consider further   evaluation with MRI if there is high clinical suspicion for traumatic   injury.  3.  Right thyroid nodule (3.6 cm), for which dedicated thyroid ultrasound   evaluation is recommended.    CT perfusion:  4. No acute core ischemic infarct or critically hypoperfused tissue at   risk. If there is continued clinical suspicion for evolving acute   cerebral ischemia, consider further evaluation with MRI/MRA.        
HPI:  Pt is a 66 yo female with a pmh/o breast ca on anastrazole, afib who is non compliant with xarelto due to cost for "few years", HTN, bradycardia, TIA/CVA, who presents to ED today after friend noticed her speech not being baseline this afternoon. Pt states symptoms first started last night while watching TV. Was sitting and noticed she could not speak or understand how to use her everyday devices such as cell phone, DVR etc. Pt states she also had a headache and so took BP and SBP was >200. At that time she took tylenol with relief and 3 norvasc which did not improve her sx or BP. Speech later became garbled however "mouth not functioning". Could not write or remember  or name. Pt states symptoms are similar to last CVA/TIA many years ago. States event started around 9 or 10 pm and gradually improved and so went to bed. This afternoon, friend called and noticed change in her pronunciation of words and brought her to ED. Code stroke called on arrival.  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      S/P breast lumpectomy            MEDICATIONS  (STANDING):  amLODIPine   Tablet 5 milliGRAM(s) Oral daily  anastrozole 1 milliGRAM(s) Oral daily  aspirin enteric coated 81 milliGRAM(s) Oral daily  atorvastatin 40 milliGRAM(s) Oral at bedtime  cholecalciferol 1000 Unit(s) Oral daily  hydrochlorothiazide 12.5 milliGRAM(s) Oral daily  influenza  Vaccine (HIGH DOSE) 0.7 milliLiter(s) IntraMuscular once  lactated ringers. 1000 milliLiter(s) (100 mL/Hr) IV Continuous <Continuous>  rivaroxaban 20 milliGRAM(s) Oral with dinner  sotalol 120 milliGRAM(s) Oral every 12 hours    MEDICATIONS  (PRN):  acetaminophen     Tablet .. 650 milliGRAM(s) Oral every 6 hours PRN Temp greater or equal to 38C (100.4F), Mild Pain (1 - 3), Moderate Pain (4 - 6)      Allergies    No Known Allergies    SOCIAL HISTORY:     FAMILY HISTORY:  FH: stroke (Father)  at 60 years old, cause of death    FH: HTN (hypertension) (Mother)        Vital Signs Last 24 Hrs  T(C): 37.2 (21 Oct 2022 08:25), Max: 37.4 (20 Oct 2022 22:45)  T(F): 98.9 (21 Oct 2022 08:25), Max: 99.4 (20 Oct 2022 22:45)  HR: 62 (21 Oct 2022 08:25) (58 - 64)  BP: 152/88 (21 Oct 2022 08:25) (97/54 - 152/88)  BP(mean): 66 (20 Oct 2022 21:45) (66 - 69)  RR: 18 (21 Oct 2022 08:25) (17 - 18)  SpO2: 97% (21 Oct 2022 08:25) (96% - 100%)    Parameters below as of 21 Oct 2022 08:25  Patient On (Oxygen Delivery Method): room air          CONSTITUTIONAL: No fever, weight loss, chills, shakes, or fatigue  EYES: No eye pain, visual disturbances, or discharge  ENMT:  No difficulty hearing, tinnitus, vertigo; No sinus or throat pain  RESPIRATORY: No cough, wheezing, hemoptysis, or shortness of breath  CARDIOVASCULAR: No chest pain, dyspnea, palpitations, dizziness, syncope  GASTROINTESTINAL: No abdominal  pain, nausea, vomiting, diarrhea, constipation, melena or bright red blood.  GENITOURINARY: No dysuria, nocturia, hematuria, or urinary incontinence  NEUROLOGICAL: No headaches, memory loss, slurred speech, limb weakness, loss of strength, numbness, or tremors  SKIN: No itching, burning, rashes, or lesions   ENDOCRINE: No heat or cold intolerance, or hair loss  MUSCULOSKELETAL: No joint pain or swelling, muscle, back, or extremity pain  PSYCHIATRIC: No depression, anxiety, or difficulty sleeping  HEME/LYMPH: No easy bruising or bleeding gums  ALLERY AND IMMUNOLOGIC: No hives or rash.    PHYSICAL EXAMINATION:    Constitutional: NAD, awake and alert, well-developed  HEENT: PERR, EOMI,  No oral cyananosis.  Neck:  supple,  No JVD  Respiratory: Breath sounds are clear bilaterally, No wheezing, rales or rhonchi  Cardiovascular: S1 and S2, regular rate and rhythm, no Murmurs, gallops or rubs  Gastrointestinal: Bowel Sounds present, soft, nontender.   Extremities: No peripheral edema. No clubbing or cyanosis.  Vascular: 2+ peripheral pulses  Neurological: A/O x 3, no focal deficits  Musculoskeletal: no calf tenderness.  Skin: No rashes.      LABS:                        13.9   9.30  )-----------( 176      ( 21 Oct 2022 07:50 )             43.5   10-21    140  |  105  |  16  ----------------------------<  96  3.4<L>   |  27  |  1.20    Ca    9.4      21 Oct 2022 07:50    TPro  7.9  /  Alb  3.7  /  TBili  1.0  /  DBili  x   /  AST  19  /  ALT  23  /  AlkPhos  109  10-20  LIVER FUNCTIONS - ( 20 Oct 2022 14:58 )  Alb: 3.7 g/dL / Pro: 7.9 gm/dL / ALK PHOS: 109 U/L / ALT: 23 U/L / AST: 19 U/L / GGT: x           PT/INR - ( 21 Oct 2022 07:50 )   PT: 30.3 sec;   INR: 2.59 ratio         PTT - ( 21 Oct 2022 07:50 )  PTT:51.8 sec        EKG:    TELEMETRY:    CARDIAC TESTS:    ECHO 2018   Summary   The left ventricle is normal in size, wall thickness, wall motion and   contractility.   Estimated left ventricular ejection fraction is 60-65 %.   The left atrium is mildly dilated.   Normal aortic valve structure and function.   Mild (1+) aortic regurgitation is present.   Normal appearing mitral valve structure and function.   Mild to moderate mitral regurgitation is present.   Mild mitral annular calcification is present.     Signature     ----------------------------------------------------------------   Electronically signed by James Orantes(Interpreting physician)   on 2018 09:40 PM   ----------------------------------------------------------------    RADIOLOGY & ADDITIONAL STUDIES:

## 2022-10-21 NOTE — DISCHARGE NOTE PROVIDER - NSDCMRMEDTOKEN_GEN_ALL_CORE_FT
amLODIPine 5 mg oral tablet: 1 tab(s) orally once a day  anastrozole 1 mg oral tablet: 1 tab(s) orally once a day  Aspir 81 oral delayed release tablet: 1 tab(s) orally once a day  atorvastatin 40 mg oral tablet: 1 tab(s) orally once a day (at bedtime)  hydroCHLOROthiazide 12.5 mg oral capsule: 1 cap(s) orally once a day  rivaroxaban 20 mg oral tablet: 1 tab(s) orally once a day (before a meal)  sotalol 120 mg oral tablet: 1 tab(s) orally 2 times a day  Vitamin D3 25 mcg (1000 intl units) oral tablet: 1 tab(s) orally once a day   amLODIPine 5 mg oral tablet: 1 tab(s) orally once a day  anastrozole 1 mg oral tablet: 1 tab(s) orally once a day  atorvastatin 40 mg oral tablet: 1 tab(s) orally once a day (at bedtime)  hydroCHLOROthiazide 12.5 mg oral capsule: 1 cap(s) orally once a day  rivaroxaban 20 mg oral tablet: 1 tab(s) orally once a day (before a meal)  sotalol 120 mg oral tablet: 1 tab(s) orally 2 times a day  Vitamin D3 25 mcg (1000 intl units) oral tablet: 1 tab(s) orally once a day

## 2022-10-21 NOTE — DISCHARGE NOTE NURSING/CASE MANAGEMENT/SOCIAL WORK - NSDCPEFALRISK_GEN_ALL_CORE
For information on Fall & Injury Prevention, visit: [email or web address] OR  [email or web address] OR  https://www.cdc.gov/steadi/patient.html

## 2022-10-21 NOTE — CONSULT NOTE ADULT - NS ATTEND AMEND GEN_ALL_CORE FT
pt w/ PAF CVA, tolerating sotalol, tele SR, no high grade AVB, high risk for TE event, should be on longterm anticoagulation

## 2022-10-21 NOTE — PROGRESS NOTE ADULT - ASSESSMENT
66 y/o female with a PMHx of HTN, HLD, Afib on sotalol, CVA 4 years ago with no residual deficits presents to the ED today at 14:30 hrs s/p an episode of pounding PETER that started around 9 pm yesterday, associated with speech difficulty, could not pronounce her name, was confused could not operate the TV remote, denied focal weakness, facial droop, visual blurring, N, V, vertigo, LOC. She reports she checked her BP it was very high, took 3 pills of Amlodipine, her HA improved, speech was back to normal within 2 hours.    Pt came to ED today upon friends advice, initially Code stroke called, later cancelled as pt was asymptomatic. Pt had been on xarelto previously discontinued due to cost  CT head chronic lacunar infarct within left BG + chronic small vessel ischemia.  CT angio H/N - no LVO or stenosis, CTP - No core infarct. C5 compression fracture -incidental finding.    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 Aviles

## 2022-10-21 NOTE — SWALLOW BEDSIDE ASSESSMENT ADULT - COMMENTS
The patient was admitted to  with acute onset of a severe headache and verbal expression difficulties which lasted approximately 2 hours and subsequently resolved. Pt did indicate that she experienced a similar event in the past which also resolved. Head CT notable for old stroke in left Basal Ganglia region as well as microvascular ischemia but no stroke residual reported. See below for additional prior medical history,

## 2022-10-21 NOTE — DISCHARGE NOTE PROVIDER - CARE PROVIDER_API CALL
Leslie Plummer J  CARDIOVASCULAR DISEASE  [address] [place] [address]  Phone: [phone]  Fax: [phone]  Follow Up Time:

## 2022-10-27 DIAGNOSIS — Z91.14 PATIENT'S OTHER NONCOMPLIANCE WITH MEDICATION REGIMEN: ICD-10-CM

## 2022-10-27 DIAGNOSIS — I44.0 ATRIOVENTRICULAR BLOCK, FIRST DEGREE: ICD-10-CM

## 2022-10-27 DIAGNOSIS — I63.412 CEREBRAL INFARCTION DUE TO EMBOLISM OF LEFT MIDDLE CEREBRAL ARTERY: ICD-10-CM

## 2022-10-27 DIAGNOSIS — E04.2 NONTOXIC MULTINODULAR GOITER: ICD-10-CM

## 2022-10-27 DIAGNOSIS — D05.01 LOBULAR CARCINOMA IN SITU OF RIGHT BREAST: ICD-10-CM

## 2022-10-27 DIAGNOSIS — T45.516A UNDERDOSING OF ANTICOAGULANTS, INITIAL ENCOUNTER: ICD-10-CM

## 2022-10-27 DIAGNOSIS — Z91.120 PATIENT'S INTENTIONAL UNDERDOSING OF MEDICATION REGIMEN DUE TO FINANCIAL HARDSHIP: ICD-10-CM

## 2022-10-27 DIAGNOSIS — I67.4 HYPERTENSIVE ENCEPHALOPATHY: ICD-10-CM

## 2022-10-27 DIAGNOSIS — E23.2 DIABETES INSIPIDUS: ICD-10-CM

## 2022-10-27 DIAGNOSIS — R29.700 NIHSS SCORE 0: ICD-10-CM

## 2022-10-27 DIAGNOSIS — Z66 DO NOT RESUSCITATE: ICD-10-CM

## 2022-10-27 DIAGNOSIS — R47.01 APHASIA: ICD-10-CM

## 2022-10-27 DIAGNOSIS — Z79.899 OTHER LONG TERM (CURRENT) DRUG THERAPY: ICD-10-CM

## 2022-10-27 DIAGNOSIS — Z86.73 PERSONAL HISTORY OF TRANSIENT ISCHEMIC ATTACK (TIA), AND CEREBRAL INFARCTION WITHOUT RESIDUAL DEFICITS: ICD-10-CM

## 2022-10-27 DIAGNOSIS — M48.52XA COLLAPSED VERTEBRA, NOT ELSEWHERE CLASSIFIED, CERVICAL REGION, INITIAL ENCOUNTER FOR FRACTURE: ICD-10-CM

## 2022-10-27 DIAGNOSIS — I16.1 HYPERTENSIVE EMERGENCY: ICD-10-CM

## 2022-10-27 DIAGNOSIS — Z79.82 LONG TERM (CURRENT) USE OF ASPIRIN: ICD-10-CM

## 2022-10-27 DIAGNOSIS — I48.0 PAROXYSMAL ATRIAL FIBRILLATION: ICD-10-CM

## 2022-10-27 DIAGNOSIS — E78.5 HYPERLIPIDEMIA, UNSPECIFIED: ICD-10-CM

## 2022-10-27 DIAGNOSIS — I10 ESSENTIAL (PRIMARY) HYPERTENSION: ICD-10-CM

## 2022-11-16 NOTE — PHYSICAL EXAM
[Sclera nonicteric] : sclera nonicteric [Conjunctiva pink] : conjunctiva pink [Supple] : supple [No Cervical Adenopathy] : no cervical adenopathy [No Thyromegaly] : no thyromegaly [No Caroid Bruits] : no carotid bruits [Clear to Auscultation Bilat] : clear to auscultation bilaterally [Normal Sinus Rhythm] : normal sinus rhythm [No Gallops] : no gallops [No Rubs] : no pericardial rub [Symmetrical] : symmetrical [Grade 3] : Ptosis Grade 3 [de-identified] : Surgical incision at 12-1:00 ( LCIS). [de-identified] : Lumpectomy at 7:00. 26-May-2017

## 2022-12-05 NOTE — ED ADULT NURSE NOTE - CAS DISCH BELONGINGS RETURNED
----- Message from SOURAV Rascon sent at 12/2/2022  8:40 AM CST -----  Addressed by Dr. Oh. Luisito connell. Now following OB/GYN for new pregnancy. Follow up as needed. We are available for follow up as well.   Not applicable

## 2023-02-10 NOTE — ED ADULT NURSE NOTE - EXTENSIONS OF SELF_ADULT
Health Maintenance Due   Topic Date Due    Hepatitis C Screening  Never done    TETANUS VACCINE  Never done    DEXA Scan  Never done    Colorectal Cancer Screening  Never done    Shingles Vaccine (1 of 2) Never done    Pneumococcal Vaccines (Age 65+) (1 - PCV) Never done    Mammogram  05/27/2017    Lipid Panel  05/25/2021    COVID-19 Vaccine (4 - Booster for Pfizer series) 12/02/2021    Influenza Vaccine (1) Never done       HM updated. Triggered LINKS and Care everywhere. Chart reviewed for Human gap report.    Shamika Jones LPN   Clinical Care Coordinator  Primary Care and Wellness       None

## 2023-04-10 ENCOUNTER — NON-APPOINTMENT (OUTPATIENT)
Age: 68
End: 2023-04-10

## 2023-04-10 ENCOUNTER — APPOINTMENT (OUTPATIENT)
Dept: ORTHOPEDIC SURGERY | Facility: CLINIC | Age: 68
End: 2023-04-10
Payer: MEDICARE

## 2023-04-10 PROCEDURE — 99204 OFFICE O/P NEW MOD 45 MIN: CPT

## 2023-04-10 PROCEDURE — 73610 X-RAY EXAM OF ANKLE: CPT | Mod: 50

## 2023-04-10 NOTE — DISCUSSION/SUMMARY
[de-identified] : Today I had a lengthy discussion with the patient regarding their bilateral ankle pain. I have addressed all the patient's concerns surrounding the pathology of their condition. XR imaging was reviewed and interpreted by me today in office. At this time I would like to obtain advanced imaging of the patient's bilateral ankles. An MRI was ordered so I can find out more about the etiology of the patient's condition. The patient should follow up with the office after obtaining the MRI. I recommend that the patient utilize Voltaren gel topically. A prescription for the Voltaren gel was ordered for the patient today. If the Voltaren gel could not be obtained, Icy Hot, Biofreeze, or Bengay can be utilized instead. I recommend that the patient utilize ice, NSAIDS PRN, and heat. They can also elevate their bilateral ankle above the level of the heart.  \par \par The patient understood and verbally agreed to the treatment plan. All of their questions were answered and they were satisfied with the visit. The patient should call the office if they have any questions or experience worsening symptoms.

## 2023-04-10 NOTE — ADDENDUM
[FreeTextEntry1] : I, JUAN ANTONIO CISNEROS, acted solely as a scribe for Dr. Jace Barnhart on this date 04/10/2023  .\par  \par All medical record entries made by the Scribe were at my, Dr. Jace Barnhart, direction and personally dictated by me on 04/10/2023 . I have reviewed the chart and agree that the record accurately reflects my personal performance of the history, physical exam, assessment and plan. I have also personally directed, reviewed, and agreed with the chart.

## 2023-04-10 NOTE — PHYSICAL EXAM
[de-identified] : General: Alert and oriented x3. In no acute distress. Pleasant in nature with a normal affect. No apparent respiratory distress.\par Left Ankle Exam\par Skin: Clean, dry, intact\par Inspection: No obvious malalignment, + swelling to foot and ankle, no effusion; no lymphadenopathy\par Pulses: 2+ DP/PT pulses\par ROM: Left Ankle 0 degrees of dorsiflexion, 40 degrees of plantarflexion, 10 degrees of subtalar motion\par Tenderness: No tenderness over the lateral malleolus, no CFL/ATFL/PTFL pain. No medial malleolus pain, no deltoid ligament pain. No proximal fibular pain. No heel pain.\par Stability: Negative anterior/posterior drawer.\par Strength: 5/5 TA/GS/EHL\par Neuro: In tact to light touch throughout\par Additional tests: Negative Mortons test, Negative syndesmosis squeeze test. \par \par General: Alert and oriented x3. In no acute distress. Pleasant in nature with a normal affect. No apparent respiratory distress.\par Right Ankle Exam\par Skin: Clean, dry, intact\par Inspection: No obvious malalignment, + swelling to foot and ankle, no effusion; no lymphadenopathy\par Pulses: 2+ DP/PT pulses\par ROM: Right Ankle 10 degrees of dorsiflexion, 40 degrees of plantarflexion, 10 degrees of subtalar motion\par Tenderness: No tenderness over the lateral malleolus, no CFL/ATFL/PTFL pain. No medial malleolus pain, no deltoid ligament pain. No proximal fibular pain. No heel pain.\par Stability: Negative anterior/posterior drawer.\par Strength: 5/5 TA/GS/EHL\par Neuro: In tact to light touch throughout\par Additional tests: Negative Mortons test, Negative syndesmosis squeeze test.  [de-identified] : 4V of the bilateral ankles were ordered obtained and reviewed by me today, 04/10/2023 , revealed: Arthritis bilaterally, No acute fracture

## 2023-04-10 NOTE — HISTORY OF PRESENT ILLNESS
[FreeTextEntry1] : The patient is a 68 year old female presenting for an initial visit of bilateral ankle pain, L>R. She endorsers bilateral global ankle/foot swelling.  She endorses left medial ankle and right ankle lateral pain. She states that she experiences a night burning sensation that prevents her from sleeping. She has a history of a right torn Achilles Tendon and TIA stroke. She on Xarelto. She states that he walks 6 miles very day. She presents today wearing Velcro shoes and is ambulating without assistance. No other complaints.

## 2023-04-18 ENCOUNTER — OUTPATIENT (OUTPATIENT)
Dept: OUTPATIENT SERVICES | Facility: HOSPITAL | Age: 68
LOS: 1 days | End: 2023-04-18
Payer: MEDICARE

## 2023-04-18 ENCOUNTER — APPOINTMENT (OUTPATIENT)
Dept: MRI IMAGING | Facility: CLINIC | Age: 68
End: 2023-04-18
Payer: MEDICARE

## 2023-04-18 ENCOUNTER — RESULT REVIEW (OUTPATIENT)
Age: 68
End: 2023-04-18

## 2023-04-18 DIAGNOSIS — Z98.890 OTHER SPECIFIED POSTPROCEDURAL STATES: Chronic | ICD-10-CM

## 2023-04-18 DIAGNOSIS — M67.88 OTHER SPECIFIED DISORDERS OF SYNOVIUM AND TENDON, OTHER SITE: ICD-10-CM

## 2023-04-18 DIAGNOSIS — M19.071 PRIMARY OSTEOARTHRITIS, RIGHT ANKLE AND FOOT: ICD-10-CM

## 2023-04-18 DIAGNOSIS — M25.471 EFFUSION, RIGHT ANKLE: ICD-10-CM

## 2023-04-18 DIAGNOSIS — Z86.79 PERSONAL HISTORY OF OTHER DISEASES OF THE CIRCULATORY SYSTEM: Chronic | ICD-10-CM

## 2023-04-18 DIAGNOSIS — M25.571 PAIN IN RIGHT ANKLE AND JOINTS OF RIGHT FOOT: ICD-10-CM

## 2023-04-18 PROCEDURE — 73721 MRI JNT OF LWR EXTRE W/O DYE: CPT | Mod: 26,LT,MH

## 2023-04-18 PROCEDURE — 73721 MRI JNT OF LWR EXTRE W/O DYE: CPT

## 2023-05-01 ENCOUNTER — NON-APPOINTMENT (OUTPATIENT)
Age: 68
End: 2023-05-01

## 2023-05-01 ENCOUNTER — APPOINTMENT (OUTPATIENT)
Dept: ORTHOPEDIC SURGERY | Facility: CLINIC | Age: 68
End: 2023-05-01
Payer: MEDICARE

## 2023-05-01 PROCEDURE — 99214 OFFICE O/P EST MOD 30 MIN: CPT

## 2023-05-01 RX ORDER — METHYLPREDNISOLONE 4 MG/1
4 TABLET ORAL
Qty: 1 | Refills: 0 | Status: ACTIVE | COMMUNITY
Start: 2023-05-01 | End: 1900-01-01

## 2023-05-01 NOTE — ADDENDUM
[FreeTextEntry1] : I, JUAN ANTONIO CISNEROS, acted solely as a scribe for Dr. Jace Barnhart on this date 05/01/2023  .\par  \par All medical record entries made by the Scribe were at my, Dr. Jace Barnhart, direction and personally dictated by me on 05/01/2023 . I have reviewed the chart and agree that the record accurately reflects my personal performance of the history, physical exam, assessment and plan. I have also personally directed, reviewed, and agreed with the chart.

## 2023-05-01 NOTE — HISTORY OF PRESENT ILLNESS
[FreeTextEntry1] : 05/01/23: CLIFFORD WEBER is a 68 year old female who presents for follow up evaluation of bilateral ankle pain and to review the MRI of both of her ankles.  No change since the previous office visit. She presents today wearing slip on shoes and is ambulating without assistance. \par \par 04/10/23:The patient is a 68 year old female presenting for an initial visit of bilateral ankle pain, L>R. She endorsers bilateral global ankle/foot swelling.  She endorses left medial ankle and right ankle lateral pain. She states that she experiences a night burning sensation that prevents her from sleeping. She has a history of a right torn Achilles Tendon and TIA stroke. She on Xarelto. She states that he walks 6 miles very day. She presents today wearing Velcro shoes and is ambulating without assistance. No other complaints.

## 2023-05-01 NOTE — DISCUSSION/SUMMARY
[de-identified] : Today I had a lengthy discussion with the patient regarding their bilateral ankle pain. I have addressed all the patient's concerns surrounding the pathology of their condition. Results of an MRI of bilateral ankles obtained on 4/18/23, was reviewed and interpreted by me today in office. I recommend the patient undergo a course of physical therapy for the bilateral ankle 2-3 times a week for a total of 6-8 weeks. A prescription was given for the physical therapy today. I recommended the patient to utilize a 6 day taper of Prednisone.  A prescription for the prednisone taper was ordered and provided for the patient today in office. I recommend that the patient utilize ice, NSAIDS PRN, and heat. They can also elevate their bilateral ankle above the level of the heart. i advised the patient to consult with their PCP about utilizing the steroid taper and to discuss any potential complications. The patient deferred the ASO brace. \par \par I recommend the patient follow up in 3-4 weeks to reassess their condition. \par \par The patient understood and verbally agreed to the treatment plan. All of their questions were answered and they were satisfied with the visit. The patient should call the office if they have any questions or experience worsening symptoms.

## 2023-05-01 NOTE — PHYSICAL EXAM
[de-identified] : General: Alert and oriented x3. In no acute distress. Pleasant in nature with a normal affect. No apparent respiratory distress.\par Left Ankle Exam\par Skin: Clean, dry, intact\par Inspection: No obvious malalignment, + swelling to foot and ankle, no effusion; no lymphadenopathy\par Pulses: 2+ DP/PT pulses\par ROM: Left Ankle 0 degrees of dorsiflexion, 40 degrees of plantarflexion, 10 degrees of subtalar motion\par Tenderness: No tenderness over the lateral malleolus, no CFL/ATFL/PTFL pain. No medial malleolus pain, no deltoid ligament pain. No proximal fibular pain. No heel pain.\par Stability: Negative anterior/posterior drawer.\par Strength: 5/5 TA/GS/EHL\par Neuro: In tact to light touch throughout\par Additional tests: Negative Mortons test, Negative syndesmosis squeeze test. \par \par General: Alert and oriented x3. In no acute distress. Pleasant in nature with a normal affect. No apparent respiratory distress.\par Right Ankle Exam\par Skin: Clean, dry, intact\par Inspection: No obvious malalignment, + swelling to foot and ankle, no effusion; no lymphadenopathy\par Pulses: 2+ DP/PT pulses\par ROM: Right Ankle 10 degrees of dorsiflexion, 40 degrees of plantarflexion, 10 degrees of subtalar motion\par Tenderness: No tenderness over the lateral malleolus, no CFL/ATFL/PTFL pain. No medial malleolus pain, no deltoid ligament pain. No proximal fibular pain. No heel pain.\par Stability: Negative anterior/posterior drawer.\par Strength: 5/5 TA/GS/EHL\par Neuro: In tact to light touch throughout\par Additional tests: Negative Mortons test, Negative syndesmosis squeeze test.  [de-identified] : \par  MR Ankle No Cont, Right             Final\par \par No Documents Attached\par \par \par \par \par   EXAM: [id number] - MR ANKLE RT  - ORDERED BY: ROMAN MAJOR\par \par \par PROCEDURE DATE:  04/18/2023\par \par \par \par INTERPRETATION:  RIGHT ANKLE MRI\par \par CLINICAL INFORMATION: Bilateral ankle pain and swelling.\par TECHNIQUE: Multiplanar, multisequence MRI was obtained of the right ankle.\par \par FINDINGS:\par \par MUSCLES AND TENDONS: Medial flexor tendons are intact. Peroneus and anterior extensors are unremarkable. Minimal thickening of the distal Achilles. Very mild atrophy of the abductor digiti minimi. Lisfranc ligament is intact.\par LIGAMENTS: Syndesmotic ligaments are intact. Attenuation of the anterior talofibular ligament. Calcaneofibular and posterior talofibular ligaments are intact. Deltoid ligament is intact.\par CARTILAGE and SUBCHONDRAL BONE: Small focus of subchondral cystic change within the posterior tibial plafond secondary to overlying focal chondral loss and at the second tarsometatarsal articulation..\par SYNOVIUM/JOINT FLUID: Small amount of fluid decompressing posteriorly from the tibiotalar/subtalar articulation.\par MARROW: Mild hindfoot valgus is no fracture or marrow edema\par PLANTAR FASCIA: Mild thickening of the central cord.\par NEUROVASCULAR STRUCTURES: Unremarkable\par SINUS TARSI: Mild infiltration of the fat within the sinus Tarsi. Cystic change decompressing laterally from the sinus Tarsi deep to the peroneus tendons at the level of the calcaneus..\par PERIPHERAL/ SUB-CUTANEOUS SOFT TISSUES: Mild subcutaneous edema about the ankle.\par \par IMPRESSION:\par Mild Achilles tendinosis with mild thickening of the plantar fascia.\par Mild infiltration of the fat within the sinus Tarsi with cystic change decompressing laterally deep to the peroneus tendons at the level of the calcaneus.\par Mild hindfoot valgus.\par \par --- End of Report ---\par \par \par \par \par \par \par NENO SANTIAGO MD; Attending Radiologist\par This document has been electronically signed. Apr 25 2023  9:46AM\par \par  \par \par  Ordered by: ROMAN MAJOR       Collected/Examined: 18Apr2023 05:23PM       \par Verified by: ROMAN MAJOR 25Apr2023 12:51PM       \par  Result Communication: No patient communication needed at this time;\par Stage: Final       \par  Performed at: [facility]       Resulted: 25Apr2023 09:37AM       Last Updated: 25Apr2023 12:51PM       Accession: [id number]       \par \par \par \par \par  MR Ankle No Cont, Left             Final\par \par No Documents Attached\par \par \par \par \par   EXAM: [id number] - MR ANKLE LT  - ORDERED BY: ROMAN MAJOR\par \par \par PROCEDURE DATE:  04/18/2023\par \par \par \par INTERPRETATION:  LEFT ANKLE MRI\par \par CLINICAL INFORMATION: Ankle pain and swelling.\par TECHNIQUE: Multiplanar, multisequence MRI was obtained of the left ankle.\par \par FINDINGS:\par \par MUSCLES AND TENDONS: Medial flexor tendons are intact. Peroneus tendons are intact. Anterior extensors are intact.  Achilles tendon is intact with mild soft tissue edema posteriorly.. There is mild atrophy of the abductor digiti minimi.\par LIGAMENTS: Syndesmotic ligaments are intact. Mild attenuation of the anterior talofibular and calcaneofibular ligaments. Deltoid ligament is intact. Nonspecific soft tissue edema is present about the anteromedial ankle at the level of the talonavicular articulation in proximity to the spring ligament and posterior tibialis tendon insertion. Lisfranc ligament is intact.\par CARTILAGE and SUBCHONDRAL BONE: Few foci of subchondral edema within the posterior tibial plafond secondary to overlying focal high-grade chondral loss. Focal subchondral edema within the medial base of the fifth metatarsal likely secondary to overlying focal chondral loss.\par SYNOVIUM/JOINT FLUID: No tibiotalar joint effusion. Small amount of fluid along the dorsal talonavicular articulation, likely decompressing from the sinus Tarsi.\par MARROW: No fracture. No marrow edema. Mild hindfoot valgus.\par PLANTAR FASCIA: Slight thickening of the central cord.\par NEUROVASCULAR STRUCTURES: Unremarkable\par SINUS TARSI: Fluid/cystic change decompressing from the sinus Tarsi\par PERIPHERAL/ SUB-CUTANEOUS SOFT TISSUES: Subcutaneous edema along the medial ankle.\par \par IMPRESSION:\par Nonspecific medial capsular soft tissue edema at the level of the talonavicular articulation in proximity to the spring ligament and posterior tibialis tendon insertion.\par Cystic change extending from the sinus Tarsi dorsally along the talonavicular articulation.\par Mild hindfoot valgus. Few focal areas of chondral loss within the posterior talar dome.\par \par --- End of Report ---\par \par \par \par \par \par \par NENO SANTIAGO MD; Attending Radiologist\par This document has been electronically signed. Apr 25 2023  9:47AM\par \par  \par \par  Ordered by: ROMAN MAJOR       Collected/Examined: 18Apr2023 05:00PM       \par Verified by: ROMAN MAJOR 25Apr2023 12:51PM       \par  Result Communication: No patient communication needed at this time;\par Stage: Final       \par  Performed at: [facility]       Resulted: 25Apr2023 09:37AM       Last Updated: 25Apr2023 12:51PM       Accession: [id number]

## 2023-05-22 ENCOUNTER — APPOINTMENT (OUTPATIENT)
Dept: ORTHOPEDIC SURGERY | Facility: CLINIC | Age: 68
End: 2023-05-22
Payer: MEDICARE

## 2023-05-22 DIAGNOSIS — M25.471 EFFUSION, RIGHT ANKLE: ICD-10-CM

## 2023-05-22 DIAGNOSIS — M76.822 POSTERIOR TIBIAL TENDINITIS, LEFT LEG: ICD-10-CM

## 2023-05-22 DIAGNOSIS — M67.88 OTHER SPECIFIED DISORDERS OF SYNOVIUM AND TENDON, OTHER SITE: ICD-10-CM

## 2023-05-22 DIAGNOSIS — M19.071 PRIMARY OSTEOARTHRITIS, RIGHT ANKLE AND FOOT: ICD-10-CM

## 2023-05-22 DIAGNOSIS — M19.079 PRIMARY OSTEOARTHRITIS, UNSPECIFIED ANKLE AND FOOT: ICD-10-CM

## 2023-05-22 DIAGNOSIS — M25.572 PAIN IN RIGHT ANKLE AND JOINTS OF RIGHT FOOT: ICD-10-CM

## 2023-05-22 DIAGNOSIS — M25.571 PAIN IN RIGHT ANKLE AND JOINTS OF RIGHT FOOT: ICD-10-CM

## 2023-05-22 DIAGNOSIS — M25.472 EFFUSION, RIGHT ANKLE: ICD-10-CM

## 2023-05-22 DIAGNOSIS — M19.072 PRIMARY OSTEOARTHRITIS, RIGHT ANKLE AND FOOT: ICD-10-CM

## 2023-05-22 DIAGNOSIS — M25.475 EFFUSION, RIGHT ANKLE: ICD-10-CM

## 2023-05-22 DIAGNOSIS — M25.474 EFFUSION, RIGHT ANKLE: ICD-10-CM

## 2023-05-22 PROCEDURE — 99213 OFFICE O/P EST LOW 20 MIN: CPT

## 2023-05-22 NOTE — DISCUSSION/SUMMARY
[de-identified] : Today I had a lengthy discussion with the patient regarding their bilateral ankle pain. I have addressed all the patient's concerns surrounding the pathology of their condition. Due to the patient's Xarelto prescription meloxicam could not be given to the patient today in office. I recommended the patient to utilize bracing options but the patient deferred. I recommend that the patient utilize ice, NSAIDS PRN, and heat. They can also elevate their bilateral ankle above the level of the heart. \par \par I recommend the patient follow up PRN to reassess their condition. \par \par The patient understood and verbally agreed to the treatment plan. All of their questions were answered and they were satisfied with the visit. The patient should call the office if they have any questions or experience worsening symptoms.

## 2023-05-22 NOTE — ADDENDUM
[FreeTextEntry1] : I, JUAN ANTONIO CISNEROS, acted solely as a scribe for Dr. Jace Barnhart on this date 05/22/2023  .\par  \par All medical record entries made by the Scribe were at my, Dr. Jace Barnhart, direction and personally dictated by me on 05/22/2023 . I have reviewed the chart and agree that the record accurately reflects my personal performance of the history, physical exam, assessment and plan. I have also personally directed, reviewed, and agreed with the chart.

## 2023-05-22 NOTE — PHYSICAL EXAM
[de-identified] : General: Alert and oriented x3. In no acute distress. Pleasant in nature with a normal affect. No apparent respiratory distress.\par Left Ankle Exam\par Skin: Clean, dry, intact\par Inspection: No obvious malalignment, + swelling to foot and ankle, no effusion; no lymphadenopathy\par Pulses: 2+ DP/PT pulses\par ROM: Left Ankle 0 degrees of dorsiflexion, 40 degrees of plantarflexion, 10 degrees of subtalar motion\par Tenderness: No tenderness over the lateral malleolus, no CFL/ATFL/PTFL pain. No medial malleolus pain, no deltoid ligament pain. No proximal fibular pain. No heel pain.\par Stability: Negative anterior/posterior drawer.\par Strength: 5/5 TA/GS/EHL\par Neuro: In tact to light touch throughout\par Additional tests: Negative Mortons test, Negative syndesmosis squeeze test. \par \par General: Alert and oriented x3. In no acute distress. Pleasant in nature with a normal affect. No apparent respiratory distress.\par Right Ankle Exam\par Skin: Clean, dry, intact\par Inspection: No obvious malalignment, + swelling to foot and ankle, no effusion; no lymphadenopathy\par Pulses: 2+ DP/PT pulses\par ROM: Right Ankle 10 degrees of dorsiflexion, 40 degrees of plantarflexion, 10 degrees of subtalar motion\par Tenderness: No tenderness over the lateral malleolus, no CFL/ATFL/PTFL pain. No medial malleolus pain, no deltoid ligament pain. No proximal fibular pain. No heel pain.\par Stability: Negative anterior/posterior drawer.\par Strength: 5/5 TA/GS/EHL\par Neuro: In tact to light touch throughout\par Additional tests: Negative Mortons test, Negative syndesmosis squeeze test.  [de-identified] : No new imaging performed

## 2023-05-22 NOTE — HISTORY OF PRESENT ILLNESS
[FreeTextEntry1] : 05/22/23: CLIFFORD WEBER is a 68 year old female who presents for follow up evaluation of her Bilateral ankle. She reports that she has been compliant with physical therapy. She presents today wearing slip on shoes and is ambulating without assistance. \par \par 05/01/23: CLIFFORD WEBER is a 68 year old female who presents for follow up evaluation of bilateral ankle pain and to review the MRI of both of her ankles.  No change since the previous office visit. She presents today wearing slip on shoes and is ambulating without assistance. \par \par 04/10/23:The patient is a 68 year old female presenting for an initial visit of bilateral ankle pain, L>R. She endorsers bilateral global ankle/foot swelling.  She endorses left medial ankle and right ankle lateral pain. She states that she experiences a night burning sensation that prevents her from sleeping. She has a history of a right torn Achilles Tendon and TIA stroke. She on Xarelto. She states that he walks 6 miles very day. She presents today wearing Velcro shoes and is ambulating without assistance. No other complaints.

## 2023-07-07 ENCOUNTER — APPOINTMENT (OUTPATIENT)
Dept: BREAST CENTER | Facility: CLINIC | Age: 68
End: 2023-07-07
Payer: MEDICARE

## 2023-07-07 VITALS
BODY MASS INDEX: 34.27 KG/M2 | SYSTOLIC BLOOD PRESSURE: 100 MMHG | HEART RATE: 55 BPM | DIASTOLIC BLOOD PRESSURE: 60 MMHG | HEIGHT: 61 IN | WEIGHT: 181.5 LBS

## 2023-07-07 DIAGNOSIS — Z85.3 PERSONAL HISTORY OF MALIGNANT NEOPLASM OF BREAST: ICD-10-CM

## 2023-07-07 DIAGNOSIS — N63.20 UNSPECIFIED LUMP IN THE LEFT BREAST, UNSPECIFIED QUADRANT: ICD-10-CM

## 2023-07-07 DIAGNOSIS — D05.01 LOBULAR CARCINOMA IN SITU OF RIGHT BREAST: ICD-10-CM

## 2023-07-07 PROCEDURE — 99204 OFFICE O/P NEW MOD 45 MIN: CPT

## 2023-07-07 NOTE — HISTORY OF PRESENT ILLNESS
[FreeTextEntry1] : 68 year old female who has a history of left breast cancer in 2017.  She had a lumpectomy and sentinel node biopsy by Dr. Vang for a 1.1 cm poorly diff invasive ductal cancer, SBR 8/9, ER 95% [place] neg Her 2 2+ CISH nonamplified, Oncotype score 28.  She had been on Arimidex since 11/2017.  She refused chemotherapy and radiation therapy.\par \par She was diagnosed with right breast LCIS in 2018 and had a right surgical biopsy. She was last seen in 8/2020.\par \par She has been having some discomfort in the upper outer left breast.\par \par

## 2023-07-07 NOTE — PHYSICAL EXAM
[Sclera nonicteric] : sclera nonicteric [Conjunctiva pink] : conjunctiva pink [Supple] : supple [No Cervical Adenopathy] : no cervical adenopathy [No Caroid Bruits] : no carotid bruits [Normal Sinus Rhythm] : normal sinus rhythm [Clear to Auscultation Bilat] : clear to auscultation bilaterally [No Gallops] : no gallops [No Rubs] : no pericardial rub [Examined in the supine and seated position] : examined in the supine and seated position [Symmetrical] : symmetrical [Grade 3] : Ptosis Grade 3 [No dominant masses] : no dominant masses in right breast  [No dominant masses] : no dominant masses left breast [No Nipple Retraction] : no left nipple retraction [No Nipple Discharge] : no left nipple discharge [No Axillary Lymphadenopathy] : no left axillary lymphadenopathy [Soft] : abdomen soft [Normal Bowel Sounds] : normal bowel sounds  [No Hepato-Splenomegaly] : no hepato-splenomegaly [No Swelling] : no swelling [No Rashes] : no rashes [EOMI] : extra ocular movement intact [de-identified] : Enlarged nodular thyroid. [de-identified] : Very large and pendulous [de-identified] : Scar 12-1:00 ( LCIS). [de-identified] : Scar 7:00.   3 cm mobile deep density axillary tail. [de-identified] : Small scar

## 2023-07-07 NOTE — CONSULT LETTER
[Dear  ___] : Dear  [unfilled], [Consult Letter:] : I had the pleasure of evaluating your patient, [unfilled]. [Sincerely,] : Sincerely, [DrLora  ___] : Dr. FRANK

## 2023-07-25 ENCOUNTER — APPOINTMENT (OUTPATIENT)
Dept: ULTRASOUND IMAGING | Facility: CLINIC | Age: 68
End: 2023-07-25

## 2023-07-25 ENCOUNTER — OUTPATIENT (OUTPATIENT)
Dept: OUTPATIENT SERVICES | Facility: HOSPITAL | Age: 68
LOS: 1 days | End: 2023-07-25
Payer: MEDICARE

## 2023-07-25 ENCOUNTER — APPOINTMENT (OUTPATIENT)
Dept: MAMMOGRAPHY | Facility: CLINIC | Age: 68
End: 2023-07-25

## 2023-07-25 ENCOUNTER — RESULT REVIEW (OUTPATIENT)
Age: 68
End: 2023-07-25

## 2023-07-25 DIAGNOSIS — D05.01 LOBULAR CARCINOMA IN SITU OF RIGHT BREAST: ICD-10-CM

## 2023-07-25 DIAGNOSIS — Z98.890 OTHER SPECIFIED POSTPROCEDURAL STATES: Chronic | ICD-10-CM

## 2023-07-25 DIAGNOSIS — Z86.79 PERSONAL HISTORY OF OTHER DISEASES OF THE CIRCULATORY SYSTEM: Chronic | ICD-10-CM

## 2023-07-25 PROCEDURE — 77066 DX MAMMO INCL CAD BI: CPT

## 2023-07-25 PROCEDURE — 76641 ULTRASOUND BREAST COMPLETE: CPT

## 2023-07-25 PROCEDURE — 76641 ULTRASOUND BREAST COMPLETE: CPT | Mod: 26,50,3G

## 2023-07-25 PROCEDURE — G0279: CPT

## 2023-07-25 PROCEDURE — G0279: CPT | Mod: 26

## 2023-07-25 PROCEDURE — 77066 DX MAMMO INCL CAD BI: CPT | Mod: 26

## 2023-08-18 ENCOUNTER — APPOINTMENT (OUTPATIENT)
Dept: MRI IMAGING | Facility: CLINIC | Age: 68
End: 2023-08-18
Payer: MEDICARE

## 2023-08-18 ENCOUNTER — OUTPATIENT (OUTPATIENT)
Dept: OUTPATIENT SERVICES | Facility: HOSPITAL | Age: 68
LOS: 1 days | End: 2023-08-18
Payer: MEDICARE

## 2023-08-18 DIAGNOSIS — C50.212 MALIGNANT NEOPLASM OF UPPER-INNER QUADRANT OF LEFT FEMALE BREAST: ICD-10-CM

## 2023-08-18 DIAGNOSIS — N63.20 UNSPECIFIED LUMP IN THE LEFT BREAST, UNSPECIFIED QUADRANT: ICD-10-CM

## 2023-08-18 DIAGNOSIS — Z98.890 OTHER SPECIFIED POSTPROCEDURAL STATES: Chronic | ICD-10-CM

## 2023-08-18 DIAGNOSIS — Z86.79 PERSONAL HISTORY OF OTHER DISEASES OF THE CIRCULATORY SYSTEM: Chronic | ICD-10-CM

## 2023-08-18 PROCEDURE — 77049 MRI BREAST C-+ W/CAD BI: CPT | Mod: 26

## 2023-08-18 PROCEDURE — C8908: CPT

## 2023-08-18 PROCEDURE — C8937: CPT

## 2023-08-18 PROCEDURE — A9585: CPT

## 2023-10-02 NOTE — ED ADULT TRIAGE NOTE - CCCP TRG CHIEF CMPLNT
Problem: Discharge Planning  Goal: Discharge to home or other facility with appropriate resources  10/2/2023 1356 by Kalpana Tello RN  Outcome: Progressing  Flowsheets (Taken 10/2/2023 1356)  Discharge to home or other facility with appropriate resources:   Identify barriers to discharge with patient and caregiver   Arrange for needed discharge resources and transportation as appropriate   Identify discharge learning needs (meds, wound care, etc)  Note: Patient plans to discharge home with roommates when medically stable. Problem: Pain  Goal: Verbalizes/displays adequate comfort level or baseline comfort level  10/2/2023 1356 by Kalpana Tello RN  Outcome: Progressing  Flowsheets  Taken 10/2/2023 1356 by Kalpana Tello RN  Verbalizes/displays adequate comfort level or baseline comfort level:   Encourage patient to monitor pain and request assistance   Assess pain using appropriate pain scale   Administer analgesics based on type and severity of pain and evaluate response   Implement non-pharmacological measures as appropriate and evaluate response  Taken 10/2/2023 0317 by Kaleb Morris RN  Verbalizes/displays adequate comfort level or baseline comfort level: Encourage patient to monitor pain and request assistance  Note: Patient complaining of pain this shift. PRN pain medication available and administered per order. Will monitor. Problem: Safety - Adult  Goal: Free from fall injury  10/2/2023 1356 by Kalpana Tello RN  Outcome: Progressing  Flowsheets (Taken 10/2/2023 1356)  Free From Fall Injury: Instruct family/caregiver on patient safety  Note: No falls noted this shift. Continue falling star program. Bed alarm on, bed in low position. Call light and personal belongings in reach. Patient uses call light appropriately.      Problem: Skin/Tissue Integrity - Adult  Goal: Skin integrity remains intact  10/2/2023 1356 by Kalpana Tello RN  Outcome: Progressing  Flowsheets (Taken 10/2/2023 1356)  Skin code: stroke

## 2023-11-18 ENCOUNTER — EMERGENCY (EMERGENCY)
Facility: HOSPITAL | Age: 68
LOS: 0 days | Discharge: HOME | End: 2023-11-18
Attending: EMERGENCY MEDICINE
Payer: MEDICARE

## 2023-11-18 VITALS
DIASTOLIC BLOOD PRESSURE: 72 MMHG | SYSTOLIC BLOOD PRESSURE: 102 MMHG | RESPIRATION RATE: 22 BRPM | HEART RATE: 84 BPM | OXYGEN SATURATION: 96 % | TEMPERATURE: 98 F

## 2023-11-18 VITALS — HEIGHT: 65 IN | WEIGHT: 164.91 LBS

## 2023-11-18 DIAGNOSIS — R53.1 WEAKNESS: ICD-10-CM

## 2023-11-18 DIAGNOSIS — Z86.73 PERSONAL HISTORY OF TRANSIENT ISCHEMIC ATTACK (TIA), AND CEREBRAL INFARCTION WITHOUT RESIDUAL DEFICITS: ICD-10-CM

## 2023-11-18 DIAGNOSIS — Z85.3 PERSONAL HISTORY OF MALIGNANT NEOPLASM OF BREAST: ICD-10-CM

## 2023-11-18 DIAGNOSIS — E23.2 DIABETES INSIPIDUS: ICD-10-CM

## 2023-11-18 DIAGNOSIS — I10 ESSENTIAL (PRIMARY) HYPERTENSION: ICD-10-CM

## 2023-11-18 DIAGNOSIS — R11.2 NAUSEA WITH VOMITING, UNSPECIFIED: ICD-10-CM

## 2023-11-18 DIAGNOSIS — I48.20 CHRONIC ATRIAL FIBRILLATION, UNSPECIFIED: ICD-10-CM

## 2023-11-18 DIAGNOSIS — Z79.01 LONG TERM (CURRENT) USE OF ANTICOAGULANTS: ICD-10-CM

## 2023-11-18 DIAGNOSIS — Z98.890 OTHER SPECIFIED POSTPROCEDURAL STATES: Chronic | ICD-10-CM

## 2023-11-18 DIAGNOSIS — Z86.79 PERSONAL HISTORY OF OTHER DISEASES OF THE CIRCULATORY SYSTEM: Chronic | ICD-10-CM

## 2023-11-18 DIAGNOSIS — R09.81 NASAL CONGESTION: ICD-10-CM

## 2023-11-18 DIAGNOSIS — E78.5 HYPERLIPIDEMIA, UNSPECIFIED: ICD-10-CM

## 2023-11-18 DIAGNOSIS — Z28.310 UNVACCINATED FOR COVID-19: ICD-10-CM

## 2023-11-18 DIAGNOSIS — U07.1 COVID-19: ICD-10-CM

## 2023-11-18 LAB
ADD ON TEST-SPECIMEN IN LAB: SIGNIFICANT CHANGE UP
ADD ON TEST-SPECIMEN IN LAB: SIGNIFICANT CHANGE UP
ALBUMIN SERPL ELPH-MCNC: 3.2 G/DL — LOW (ref 3.3–5)
ALBUMIN SERPL ELPH-MCNC: 3.2 G/DL — LOW (ref 3.3–5)
ALP SERPL-CCNC: 97 U/L — SIGNIFICANT CHANGE UP (ref 40–120)
ALP SERPL-CCNC: 97 U/L — SIGNIFICANT CHANGE UP (ref 40–120)
ALT FLD-CCNC: 26 U/L — SIGNIFICANT CHANGE UP (ref 12–78)
ALT FLD-CCNC: 26 U/L — SIGNIFICANT CHANGE UP (ref 12–78)
ANION GAP SERPL CALC-SCNC: 7 MMOL/L — SIGNIFICANT CHANGE UP (ref 5–17)
ANION GAP SERPL CALC-SCNC: 7 MMOL/L — SIGNIFICANT CHANGE UP (ref 5–17)
APPEARANCE UR: CLEAR — SIGNIFICANT CHANGE UP
APPEARANCE UR: CLEAR — SIGNIFICANT CHANGE UP
APTT BLD: 33.4 SEC — SIGNIFICANT CHANGE UP (ref 24.5–35.6)
APTT BLD: 33.4 SEC — SIGNIFICANT CHANGE UP (ref 24.5–35.6)
AST SERPL-CCNC: 25 U/L — SIGNIFICANT CHANGE UP (ref 15–37)
AST SERPL-CCNC: 25 U/L — SIGNIFICANT CHANGE UP (ref 15–37)
BACTERIA # UR AUTO: NEGATIVE /HPF — SIGNIFICANT CHANGE UP
BACTERIA # UR AUTO: NEGATIVE /HPF — SIGNIFICANT CHANGE UP
BASOPHILS # BLD AUTO: 0.02 K/UL — SIGNIFICANT CHANGE UP (ref 0–0.2)
BASOPHILS # BLD AUTO: 0.02 K/UL — SIGNIFICANT CHANGE UP (ref 0–0.2)
BASOPHILS NFR BLD AUTO: 0.3 % — SIGNIFICANT CHANGE UP (ref 0–2)
BASOPHILS NFR BLD AUTO: 0.3 % — SIGNIFICANT CHANGE UP (ref 0–2)
BILIRUB SERPL-MCNC: 0.6 MG/DL — SIGNIFICANT CHANGE UP (ref 0.2–1.2)
BILIRUB SERPL-MCNC: 0.6 MG/DL — SIGNIFICANT CHANGE UP (ref 0.2–1.2)
BILIRUB UR-MCNC: NEGATIVE — SIGNIFICANT CHANGE UP
BILIRUB UR-MCNC: NEGATIVE — SIGNIFICANT CHANGE UP
BUN SERPL-MCNC: 24 MG/DL — HIGH (ref 7–23)
BUN SERPL-MCNC: 24 MG/DL — HIGH (ref 7–23)
CALCIUM SERPL-MCNC: 8.7 MG/DL — SIGNIFICANT CHANGE UP (ref 8.5–10.1)
CALCIUM SERPL-MCNC: 8.7 MG/DL — SIGNIFICANT CHANGE UP (ref 8.5–10.1)
CHLORIDE SERPL-SCNC: 100 MMOL/L — SIGNIFICANT CHANGE UP (ref 96–108)
CHLORIDE SERPL-SCNC: 100 MMOL/L — SIGNIFICANT CHANGE UP (ref 96–108)
CO2 SERPL-SCNC: 32 MMOL/L — HIGH (ref 22–31)
CO2 SERPL-SCNC: 32 MMOL/L — HIGH (ref 22–31)
COLOR SPEC: YELLOW — SIGNIFICANT CHANGE UP
COLOR SPEC: YELLOW — SIGNIFICANT CHANGE UP
CREAT SERPL-MCNC: 1.49 MG/DL — HIGH (ref 0.5–1.3)
CREAT SERPL-MCNC: 1.49 MG/DL — HIGH (ref 0.5–1.3)
DIFF PNL FLD: ABNORMAL
DIFF PNL FLD: ABNORMAL
EGFR: 38 ML/MIN/1.73M2 — LOW
EGFR: 38 ML/MIN/1.73M2 — LOW
EOSINOPHIL # BLD AUTO: 0.07 K/UL — SIGNIFICANT CHANGE UP (ref 0–0.5)
EOSINOPHIL # BLD AUTO: 0.07 K/UL — SIGNIFICANT CHANGE UP (ref 0–0.5)
EOSINOPHIL NFR BLD AUTO: 1.1 % — SIGNIFICANT CHANGE UP (ref 0–6)
EOSINOPHIL NFR BLD AUTO: 1.1 % — SIGNIFICANT CHANGE UP (ref 0–6)
GLUCOSE SERPL-MCNC: 119 MG/DL — HIGH (ref 70–99)
GLUCOSE SERPL-MCNC: 119 MG/DL — HIGH (ref 70–99)
GLUCOSE UR QL: NEGATIVE MG/DL — SIGNIFICANT CHANGE UP
GLUCOSE UR QL: NEGATIVE MG/DL — SIGNIFICANT CHANGE UP
HCT VFR BLD CALC: 49.3 % — HIGH (ref 34.5–45)
HCT VFR BLD CALC: 49.3 % — HIGH (ref 34.5–45)
HGB BLD-MCNC: 16 G/DL — HIGH (ref 11.5–15.5)
HGB BLD-MCNC: 16 G/DL — HIGH (ref 11.5–15.5)
IMM GRANULOCYTES NFR BLD AUTO: 0.3 % — SIGNIFICANT CHANGE UP (ref 0–0.9)
IMM GRANULOCYTES NFR BLD AUTO: 0.3 % — SIGNIFICANT CHANGE UP (ref 0–0.9)
INR BLD: 1.06 RATIO — SIGNIFICANT CHANGE UP (ref 0.85–1.18)
INR BLD: 1.06 RATIO — SIGNIFICANT CHANGE UP (ref 0.85–1.18)
KETONES UR-MCNC: NEGATIVE MG/DL — SIGNIFICANT CHANGE UP
KETONES UR-MCNC: NEGATIVE MG/DL — SIGNIFICANT CHANGE UP
LACTATE SERPL-SCNC: 1.7 MMOL/L — SIGNIFICANT CHANGE UP (ref 0.7–2)
LACTATE SERPL-SCNC: 1.7 MMOL/L — SIGNIFICANT CHANGE UP (ref 0.7–2)
LEUKOCYTE ESTERASE UR-ACNC: ABNORMAL
LEUKOCYTE ESTERASE UR-ACNC: ABNORMAL
LYMPHOCYTES # BLD AUTO: 1.58 K/UL — SIGNIFICANT CHANGE UP (ref 1–3.3)
LYMPHOCYTES # BLD AUTO: 1.58 K/UL — SIGNIFICANT CHANGE UP (ref 1–3.3)
LYMPHOCYTES # BLD AUTO: 24.3 % — SIGNIFICANT CHANGE UP (ref 13–44)
LYMPHOCYTES # BLD AUTO: 24.3 % — SIGNIFICANT CHANGE UP (ref 13–44)
MAGNESIUM SERPL-MCNC: 2.2 MG/DL — SIGNIFICANT CHANGE UP (ref 1.6–2.6)
MAGNESIUM SERPL-MCNC: 2.2 MG/DL — SIGNIFICANT CHANGE UP (ref 1.6–2.6)
MCHC RBC-ENTMCNC: 29.1 PG — SIGNIFICANT CHANGE UP (ref 27–34)
MCHC RBC-ENTMCNC: 29.1 PG — SIGNIFICANT CHANGE UP (ref 27–34)
MCHC RBC-ENTMCNC: 32.5 GM/DL — SIGNIFICANT CHANGE UP (ref 32–36)
MCHC RBC-ENTMCNC: 32.5 GM/DL — SIGNIFICANT CHANGE UP (ref 32–36)
MCV RBC AUTO: 89.6 FL — SIGNIFICANT CHANGE UP (ref 80–100)
MCV RBC AUTO: 89.6 FL — SIGNIFICANT CHANGE UP (ref 80–100)
MONOCYTES # BLD AUTO: 0.8 K/UL — SIGNIFICANT CHANGE UP (ref 0–0.9)
MONOCYTES # BLD AUTO: 0.8 K/UL — SIGNIFICANT CHANGE UP (ref 0–0.9)
MONOCYTES NFR BLD AUTO: 12.3 % — SIGNIFICANT CHANGE UP (ref 2–14)
MONOCYTES NFR BLD AUTO: 12.3 % — SIGNIFICANT CHANGE UP (ref 2–14)
NEUTROPHILS # BLD AUTO: 4.01 K/UL — SIGNIFICANT CHANGE UP (ref 1.8–7.4)
NEUTROPHILS # BLD AUTO: 4.01 K/UL — SIGNIFICANT CHANGE UP (ref 1.8–7.4)
NEUTROPHILS NFR BLD AUTO: 61.7 % — SIGNIFICANT CHANGE UP (ref 43–77)
NEUTROPHILS NFR BLD AUTO: 61.7 % — SIGNIFICANT CHANGE UP (ref 43–77)
NITRITE UR-MCNC: NEGATIVE — SIGNIFICANT CHANGE UP
NITRITE UR-MCNC: NEGATIVE — SIGNIFICANT CHANGE UP
PH UR: 5.5 — SIGNIFICANT CHANGE UP (ref 5–8)
PH UR: 5.5 — SIGNIFICANT CHANGE UP (ref 5–8)
PHOSPHATE SERPL-MCNC: 3.5 MG/DL — SIGNIFICANT CHANGE UP (ref 2.5–4.5)
PHOSPHATE SERPL-MCNC: 3.5 MG/DL — SIGNIFICANT CHANGE UP (ref 2.5–4.5)
PLATELET # BLD AUTO: 190 K/UL — SIGNIFICANT CHANGE UP (ref 150–400)
PLATELET # BLD AUTO: 190 K/UL — SIGNIFICANT CHANGE UP (ref 150–400)
POTASSIUM SERPL-MCNC: 3.1 MMOL/L — LOW (ref 3.5–5.3)
POTASSIUM SERPL-MCNC: 3.1 MMOL/L — LOW (ref 3.5–5.3)
POTASSIUM SERPL-SCNC: 3.1 MMOL/L — LOW (ref 3.5–5.3)
POTASSIUM SERPL-SCNC: 3.1 MMOL/L — LOW (ref 3.5–5.3)
PROT SERPL-MCNC: 7.6 GM/DL — SIGNIFICANT CHANGE UP (ref 6–8.3)
PROT SERPL-MCNC: 7.6 GM/DL — SIGNIFICANT CHANGE UP (ref 6–8.3)
PROT UR-MCNC: NEGATIVE MG/DL — SIGNIFICANT CHANGE UP
PROT UR-MCNC: NEGATIVE MG/DL — SIGNIFICANT CHANGE UP
PROTHROM AB SERPL-ACNC: 12 SEC — SIGNIFICANT CHANGE UP (ref 9.5–13)
PROTHROM AB SERPL-ACNC: 12 SEC — SIGNIFICANT CHANGE UP (ref 9.5–13)
RAPID RVP RESULT: DETECTED
RAPID RVP RESULT: DETECTED
RBC # BLD: 5.5 M/UL — HIGH (ref 3.8–5.2)
RBC # BLD: 5.5 M/UL — HIGH (ref 3.8–5.2)
RBC # FLD: 13.6 % — SIGNIFICANT CHANGE UP (ref 10.3–14.5)
RBC # FLD: 13.6 % — SIGNIFICANT CHANGE UP (ref 10.3–14.5)
RBC CASTS # UR COMP ASSIST: 1 /HPF — SIGNIFICANT CHANGE UP (ref 0–4)
RBC CASTS # UR COMP ASSIST: 1 /HPF — SIGNIFICANT CHANGE UP (ref 0–4)
SARS-COV-2 RNA SPEC QL NAA+PROBE: DETECTED
SARS-COV-2 RNA SPEC QL NAA+PROBE: DETECTED
SODIUM SERPL-SCNC: 139 MMOL/L — SIGNIFICANT CHANGE UP (ref 135–145)
SODIUM SERPL-SCNC: 139 MMOL/L — SIGNIFICANT CHANGE UP (ref 135–145)
SP GR SPEC: 1.01 — SIGNIFICANT CHANGE UP (ref 1–1.03)
SP GR SPEC: 1.01 — SIGNIFICANT CHANGE UP (ref 1–1.03)
SQUAMOUS # UR AUTO: 2 /HPF — SIGNIFICANT CHANGE UP (ref 0–5)
SQUAMOUS # UR AUTO: 2 /HPF — SIGNIFICANT CHANGE UP (ref 0–5)
UROBILINOGEN FLD QL: 0.2 MG/DL — SIGNIFICANT CHANGE UP (ref 0.2–1)
UROBILINOGEN FLD QL: 0.2 MG/DL — SIGNIFICANT CHANGE UP (ref 0.2–1)
WBC # BLD: 6.5 K/UL — SIGNIFICANT CHANGE UP (ref 3.8–10.5)
WBC # BLD: 6.5 K/UL — SIGNIFICANT CHANGE UP (ref 3.8–10.5)
WBC # FLD AUTO: 6.5 K/UL — SIGNIFICANT CHANGE UP (ref 3.8–10.5)
WBC # FLD AUTO: 6.5 K/UL — SIGNIFICANT CHANGE UP (ref 3.8–10.5)
WBC UR QL: 3 /HPF — SIGNIFICANT CHANGE UP (ref 0–5)
WBC UR QL: 3 /HPF — SIGNIFICANT CHANGE UP (ref 0–5)

## 2023-11-18 PROCEDURE — 81001 URINALYSIS AUTO W/SCOPE: CPT

## 2023-11-18 PROCEDURE — 80053 COMPREHEN METABOLIC PANEL: CPT

## 2023-11-18 PROCEDURE — 84100 ASSAY OF PHOSPHORUS: CPT

## 2023-11-18 PROCEDURE — 71045 X-RAY EXAM CHEST 1 VIEW: CPT

## 2023-11-18 PROCEDURE — 96360 HYDRATION IV INFUSION INIT: CPT

## 2023-11-18 PROCEDURE — 71045 X-RAY EXAM CHEST 1 VIEW: CPT | Mod: 26

## 2023-11-18 PROCEDURE — 93005 ELECTROCARDIOGRAM TRACING: CPT

## 2023-11-18 PROCEDURE — 83605 ASSAY OF LACTIC ACID: CPT

## 2023-11-18 PROCEDURE — 85610 PROTHROMBIN TIME: CPT

## 2023-11-18 PROCEDURE — 0225U NFCT DS DNA&RNA 21 SARSCOV2: CPT

## 2023-11-18 PROCEDURE — 87086 URINE CULTURE/COLONY COUNT: CPT

## 2023-11-18 PROCEDURE — 99285 EMERGENCY DEPT VISIT HI MDM: CPT | Mod: 25

## 2023-11-18 PROCEDURE — 87040 BLOOD CULTURE FOR BACTERIA: CPT | Mod: 91

## 2023-11-18 PROCEDURE — 85730 THROMBOPLASTIN TIME PARTIAL: CPT

## 2023-11-18 PROCEDURE — 93010 ELECTROCARDIOGRAM REPORT: CPT

## 2023-11-18 PROCEDURE — 85025 COMPLETE CBC W/AUTO DIFF WBC: CPT

## 2023-11-18 PROCEDURE — 36415 COLL VENOUS BLD VENIPUNCTURE: CPT

## 2023-11-18 PROCEDURE — 99285 EMERGENCY DEPT VISIT HI MDM: CPT

## 2023-11-18 PROCEDURE — 83735 ASSAY OF MAGNESIUM: CPT

## 2023-11-18 RX ORDER — POTASSIUM CHLORIDE 20 MEQ
40 PACKET (EA) ORAL ONCE
Refills: 0 | Status: COMPLETED | OUTPATIENT
Start: 2023-11-18 | End: 2023-11-18

## 2023-11-18 RX ORDER — SODIUM CHLORIDE 9 MG/ML
2300 INJECTION INTRAMUSCULAR; INTRAVENOUS; SUBCUTANEOUS ONCE
Refills: 0 | Status: COMPLETED | OUTPATIENT
Start: 2023-11-18 | End: 2023-11-18

## 2023-11-18 RX ORDER — ALBUTEROL 90 UG/1
1 AEROSOL, METERED ORAL ONCE
Refills: 0 | Status: COMPLETED | OUTPATIENT
Start: 2023-11-18 | End: 2023-11-18

## 2023-11-18 RX ADMIN — SODIUM CHLORIDE 2300 MILLILITER(S): 9 INJECTION INTRAMUSCULAR; INTRAVENOUS; SUBCUTANEOUS at 14:47

## 2023-11-18 RX ADMIN — SODIUM CHLORIDE 2300 MILLILITER(S): 9 INJECTION INTRAMUSCULAR; INTRAVENOUS; SUBCUTANEOUS at 13:47

## 2023-11-18 RX ADMIN — Medication 40 MILLIEQUIVALENT(S): at 15:16

## 2023-11-18 NOTE — ED STATDOCS - NSICDXFAMILYHX_GEN_ALL_CORE_FT
FAMILY HISTORY:  Father  Still living? Unknown  FH: stroke, Age at diagnosis: Age Unknown    Mother  Still living? Unknown  FH: HTN (hypertension), Age at diagnosis: Age Unknown     negative

## 2023-11-18 NOTE — ED ADULT NURSE NOTE - CAS DISCH TRANSFER METHOD
ACUTE ABDOMEN SERIES



Indication: Abdominal pain and nausea for 3 days



Date of service:1/23/2021 .Comparison: None acute abdominal series from 1/1/2020



Procedure: PA chest and upright and supine abdomen views are obtained.



Findings: 



Chest:  Cardiac size and pulmonary vessels are normal. Pneumonia, pneumothorax or pleural effusion ar
e not present. Bones are normal 



Abdomen: No evidence of free air is present. Gas pattern is normal .



Impression: 



Normal Chest . 

Normal abdomen without obstruction, ileus or free air  .



If indicated, CT scan of the abdomen would be useful for further evaluation.



Electronically signed by: Audrey Lara MD (1/23/2021 7:17 PM) AXEL
Exam performed: CT scan of the abdomen and pelvis with contrast



Clinical Indication:Left pelvic pain 



Date of Service:1/23/2021 comparison: CT abdomen and pelvis from 1/1/2020



Technique: Contiguous helical acquisitions are obtained  from the lung bases to the pelvis during int
ravenous administration of [75 mL of Isovue-320].  In addition oral contrast was also given. Sagittal
 and coronal reformatted images were obtained and reviewed. 



CT abdomen and pelvis findings:



The lung bases appear essentially clear. Visualized heart is normal.



Mild hepatic steatosis without focal lesions. The spleen, pancreas and gallbladder appear normal. Bot
h adrenal glands and bilateral kidneys are normal in size with symmetric excretion of contrast via av
th kidneys. Aorta is normal in caliber. There is a small approximately 7 mm aortocaval lymph node. Th
is small bowel loops are nondilated and unremarkable. There are several mildly enlarged mesenteric ly
mph nodes at the root of mesentery, the largest lymph node measures up to 8 mm in short axis dimensio
n visualized appendix is normal. No inflammatory changes seen in the right lower quadrant. There is d
iffuse scattered stool predominantly in the rectosigmoid region. The urinary bladder is distended. Pr
ostate gland, seminal vesicles and rectum appear normal.



Sagittal and coronal  reformatted images were obtained and reviewed which demonstrate no additional f
indings. 



Impression abdomen and pelvis :



 1. No acute intra-abdominal or pelvic process is detected.

2. Diffuse scattered stool throughout the colon including rectosigmoid region. Correlate clinically f
or constipation.

3. Mildly enlarged mesenteric and retroperitoneal lymph nodes of unclear clinical significance. If in
dicated short-term interval follow-up CT of the abdomen and pelvis may be obtained to ensure interval
 resolution.





PQRS Compliance Statement:



One or more of the following individualized dose reduction techniques were utilized for this examinat
ion:

1. Automated exposure control

2. Adjustment of the mA and/or kV according to patient size

3. Use of iterative reconstruction technique



Electronically signed by: Audrey Lara MD (1/23/2021 9:56 PM) [facility]
Private car

## 2023-11-18 NOTE — ED PROVIDER NOTE - PATIENT PORTAL LINK FT
You can access the FollowMyHealth Patient Portal offered by [facility] by registering at the following website: [email or web address]. By joining [email or web address]’s FollowMyHealth portal, you will also be able to view your health information using other applications (apps) compatible with our system.

## 2023-11-18 NOTE — ED ADULT TRIAGE NOTE - CHIEF COMPLAINT QUOTE
pt complaining of flu-like symptoms since sunday with runny nose, chills, and body aches.  no medications PTA

## 2023-11-18 NOTE — ED PROVIDER NOTE - NSFOLLOWUPINSTRUCTIONS_ED_ALL_ED_FT
1) Please follow-up with your primary care doctor in the next 5-7 days.  Please call tomorrow for an appointment.  If you cannot follow-up with your primary care doctor please return to the ED for any urgent issues.  2) You were given a copy of the tests performed today.  Please bring the results with you and review them with your primary care doctor.  3) If you have any worsening of symptoms or any other concerns please return to the ED immediately.  4) Please continue taking your home medications as directed.    Viral respiratory infection is an illness that affects parts of the body used for breathing, like the lungs, nose, and throat. It is caused by a germ called a virus. Symptoms can include runny nose, coughing, sneezing, fatigue, body aches, sore throat, fever, or headache. Over the counter medicine can be used to manage the symptoms but the infection typically goes away on its own in 5 to 10 days.     SEEK IMMEDIATE MEDICAL CARE IF YOU HAVE ANY OF THE FOLLOWING SYMPTOMS: shortness of breath, chest pain, fever over 10 days, or lightheadedness/dizziness.

## 2023-11-18 NOTE — ED PROVIDER NOTE - OBJECTIVE STATEMENT
69 y/o female with PMHx of chronic Afib on Xarelto, CVA, diabetes insipidus, HTN on Amlodipine and Hydrochlorothiazide, HLD, and breast CA presents to ED for generalized weakness, chills, n/v, runny nose, constipation, and body aches x6 days. Denies sick contacts. Pt states she initially just thought it was a cold however Sx have been worsening so came to ED for eval. Pt also c/o intermittent HA and decreased PO intake. Pt able to ambulate. Pt not vaccinated for COVID. No other complaints at time. PCP: Dr. Plummer.

## 2023-11-18 NOTE — ED PROVIDER NOTE - PROGRESS NOTE DETAILS
Ozzy: patient received in sign out from Dr. Patrick. Patient improved. Ambulating independently. Found to have covid. Patient aware. Given albuterol for cough sxs. Understands return precautions and f/u.

## 2023-11-18 NOTE — ED ADULT NURSE NOTE - OBJECTIVE STATEMENT
patient states having chest congestion, cough, weakness, shortness of breath for one week. Cardiac and O2 sat monitor in place. 20 G IV inserted into right A/C.

## 2023-11-18 NOTE — ED STATDOCS - PROGRESS NOTE DETAILS
Laura Santana for attending Dr. Wang:   67 y/o female with PMHx of chronic Afib, CVA, diabetes insipidus, HTN, HLD, breast CA, presents to ED for general malaise x5 days. States she has been having flu-like symptoms. Family/friend at bedside reports recent unintentional weight loss of 20lbs since 4 days ago. BP in ED is 84/58. Pt is pale-appearing, diaphoretic, and hypotensive. Will send to main ED for further evaluation.

## 2023-11-20 LAB
CULTURE RESULTS: SIGNIFICANT CHANGE UP
CULTURE RESULTS: SIGNIFICANT CHANGE UP
SPECIMEN SOURCE: SIGNIFICANT CHANGE UP
SPECIMEN SOURCE: SIGNIFICANT CHANGE UP

## 2023-11-23 LAB
CULTURE RESULTS: SIGNIFICANT CHANGE UP
SPECIMEN SOURCE: SIGNIFICANT CHANGE UP

## 2024-03-10 NOTE — PATIENT PROFILE ADULT. - PATIENT REPRESENTATIVE: ( YOU CAN CHOOSE ANY PERSON THAT CAN ASSIST YOU WITH YOUR HEALTH CARE PREFERENCES, DOES NOT HAVE TO BE A SPOUSE, IMMEDIATE FAMILY OR SIGNIFICANT OTHER/PARTNER)
Phoned [facility] at [phone] to request transfer for neurology services.  Ally informed me that they are not accepting any Inpatient neurology transfers at this time.  No specific reason was given.   Declines

## 2024-06-08 NOTE — ED PROVIDER NOTE - NIH STROKE SCALE: 1C. LOC COMMANDS, QM
Patient more weaker in the middle of the night, leans to left and having a hard time sitting up, hand grasp same as initial assessment, speech soft spoken by clear. Up to bedside commode, and returned back to bed, patient mentioned the external catheter and this nurse explained the more she gets up the stronger she will become. Patient stated \"that makes sense. \"Patient is oriented x3 , unable to know the year. Will continue to monitor.    (0) Performs both tasks correctly

## 2024-12-17 ENCOUNTER — APPOINTMENT (OUTPATIENT)
Dept: MAMMOGRAPHY | Facility: CLINIC | Age: 69
End: 2024-12-17
Payer: MEDICARE

## 2024-12-17 ENCOUNTER — APPOINTMENT (OUTPATIENT)
Dept: ULTRASOUND IMAGING | Facility: CLINIC | Age: 69
End: 2024-12-17
Payer: MEDICARE

## 2024-12-17 ENCOUNTER — RESULT REVIEW (OUTPATIENT)
Age: 69
End: 2024-12-17

## 2024-12-17 ENCOUNTER — OUTPATIENT (OUTPATIENT)
Dept: OUTPATIENT SERVICES | Facility: HOSPITAL | Age: 69
LOS: 1 days | End: 2024-12-17
Payer: MEDICARE

## 2024-12-17 DIAGNOSIS — Z00.00 ENCOUNTER FOR GENERAL ADULT MEDICAL EXAMINATION WITHOUT ABNORMAL FINDINGS: ICD-10-CM

## 2024-12-17 DIAGNOSIS — Z98.890 OTHER SPECIFIED POSTPROCEDURAL STATES: Chronic | ICD-10-CM

## 2024-12-17 DIAGNOSIS — D05.01 LOBULAR CARCINOMA IN SITU OF RIGHT BREAST: ICD-10-CM

## 2024-12-17 DIAGNOSIS — Z86.79 PERSONAL HISTORY OF OTHER DISEASES OF THE CIRCULATORY SYSTEM: Chronic | ICD-10-CM

## 2024-12-17 PROCEDURE — 77067 SCR MAMMO BI INCL CAD: CPT | Mod: 26

## 2024-12-17 PROCEDURE — 77063 BREAST TOMOSYNTHESIS BI: CPT | Mod: 26

## 2024-12-17 PROCEDURE — 77063 BREAST TOMOSYNTHESIS BI: CPT

## 2024-12-17 PROCEDURE — 77067 SCR MAMMO BI INCL CAD: CPT

## 2024-12-24 PROBLEM — F10.90 ALCOHOL USE: Status: ACTIVE | Noted: 2017-08-28

## 2025-01-03 ENCOUNTER — NON-APPOINTMENT (OUTPATIENT)
Age: 70
End: 2025-01-03

## 2025-01-09 ENCOUNTER — OUTPATIENT (OUTPATIENT)
Dept: OUTPATIENT SERVICES | Facility: HOSPITAL | Age: 70
LOS: 1 days | Discharge: HOME | End: 2025-01-09
Payer: MEDICARE

## 2025-01-09 VITALS
TEMPERATURE: 99 F | SYSTOLIC BLOOD PRESSURE: 120 MMHG | HEIGHT: 61 IN | DIASTOLIC BLOOD PRESSURE: 94 MMHG | RESPIRATION RATE: 19 BRPM | WEIGHT: 184.09 LBS | HEART RATE: 92 BPM | OXYGEN SATURATION: 98 %

## 2025-01-09 DIAGNOSIS — Z98.890 OTHER SPECIFIED POSTPROCEDURAL STATES: Chronic | ICD-10-CM

## 2025-01-09 DIAGNOSIS — R19.5 OTHER FECAL ABNORMALITIES: ICD-10-CM

## 2025-01-09 DIAGNOSIS — Z86.79 PERSONAL HISTORY OF OTHER DISEASES OF THE CIRCULATORY SYSTEM: Chronic | ICD-10-CM

## 2025-01-09 PROCEDURE — 88305 TISSUE EXAM BY PATHOLOGIST: CPT

## 2025-01-09 PROCEDURE — 88305 TISSUE EXAM BY PATHOLOGIST: CPT | Mod: 26

## 2025-01-09 RX ORDER — DESMOPRESSIN ACETATE 0.1 MG/1
1 TABLET ORAL
Refills: 0 | DISCHARGE

## 2025-01-09 RX ORDER — RIVAROXABAN 2.5 MG/1
1 TABLET, FILM COATED ORAL
Refills: 0 | DISCHARGE

## 2025-01-09 NOTE — ASU PATIENT PROFILE, ADULT - FALL HARM RISK - HARM RISK INTERVENTIONS

## 2025-01-09 NOTE — ASU PATIENT PROFILE, ADULT - NSICDXPASTMEDICALHX_GEN_ALL_CORE_FT
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

## 2025-01-13 LAB — SURGICAL PATHOLOGY STUDY: SIGNIFICANT CHANGE UP

## 2025-01-14 DIAGNOSIS — Z79.01 LONG TERM (CURRENT) USE OF ANTICOAGULANTS: ICD-10-CM

## 2025-01-14 DIAGNOSIS — K64.8 OTHER HEMORRHOIDS: ICD-10-CM

## 2025-01-14 DIAGNOSIS — Z86.73 PERSONAL HISTORY OF TRANSIENT ISCHEMIC ATTACK (TIA), AND CEREBRAL INFARCTION WITHOUT RESIDUAL DEFICITS: ICD-10-CM

## 2025-01-14 DIAGNOSIS — R19.5 OTHER FECAL ABNORMALITIES: ICD-10-CM

## 2025-01-14 DIAGNOSIS — Z12.11 ENCOUNTER FOR SCREENING FOR MALIGNANT NEOPLASM OF COLON: ICD-10-CM

## 2025-01-14 DIAGNOSIS — D12.5 BENIGN NEOPLASM OF SIGMOID COLON: ICD-10-CM

## 2025-01-14 DIAGNOSIS — E11.9 TYPE 2 DIABETES MELLITUS WITHOUT COMPLICATIONS: ICD-10-CM

## 2025-01-14 DIAGNOSIS — D12.2 BENIGN NEOPLASM OF ASCENDING COLON: ICD-10-CM

## 2025-01-14 DIAGNOSIS — I10 ESSENTIAL (PRIMARY) HYPERTENSION: ICD-10-CM

## 2025-01-14 DIAGNOSIS — I48.0 PAROXYSMAL ATRIAL FIBRILLATION: ICD-10-CM

## 2025-01-14 DIAGNOSIS — D12.3 BENIGN NEOPLASM OF TRANSVERSE COLON: ICD-10-CM

## 2025-01-14 DIAGNOSIS — D12.0 BENIGN NEOPLASM OF CECUM: ICD-10-CM

## 2025-04-25 ENCOUNTER — APPOINTMENT (OUTPATIENT)
Dept: BREAST CENTER | Facility: CLINIC | Age: 70
End: 2025-04-25

## 2025-04-25 VITALS
WEIGHT: 188 LBS | HEIGHT: 61 IN | HEART RATE: 109 BPM | BODY MASS INDEX: 35.5 KG/M2 | SYSTOLIC BLOOD PRESSURE: 121 MMHG | DIASTOLIC BLOOD PRESSURE: 89 MMHG

## 2025-04-25 DIAGNOSIS — D05.01 LOBULAR CARCINOMA IN SITU OF RIGHT BREAST: ICD-10-CM

## 2025-04-25 DIAGNOSIS — K63.5 POLYP OF COLON: ICD-10-CM

## 2025-04-25 DIAGNOSIS — Z17.0 MALIGNANT NEOPLASM OF UPPER-INNER QUADRANT OF LEFT FEMALE BREAST: ICD-10-CM

## 2025-04-25 DIAGNOSIS — C50.212 MALIGNANT NEOPLASM OF UPPER-INNER QUADRANT OF LEFT FEMALE BREAST: ICD-10-CM

## 2025-04-25 PROCEDURE — 99213 OFFICE O/P EST LOW 20 MIN: CPT

## 2025-05-06 ENCOUNTER — OUTPATIENT (OUTPATIENT)
Dept: OUTPATIENT SERVICES | Facility: HOSPITAL | Age: 70
LOS: 1 days | End: 2025-05-06
Payer: MEDICARE

## 2025-05-06 ENCOUNTER — APPOINTMENT (OUTPATIENT)
Dept: MRI IMAGING | Facility: CLINIC | Age: 70
End: 2025-05-06
Payer: MEDICARE

## 2025-05-06 DIAGNOSIS — C50.212 MALIGNANT NEOPLASM OF UPPER-INNER QUADRANT OF LEFT FEMALE BREAST: ICD-10-CM

## 2025-05-06 DIAGNOSIS — Z86.79 PERSONAL HISTORY OF OTHER DISEASES OF THE CIRCULATORY SYSTEM: Chronic | ICD-10-CM

## 2025-05-06 DIAGNOSIS — Z98.890 OTHER SPECIFIED POSTPROCEDURAL STATES: Chronic | ICD-10-CM

## 2025-05-06 DIAGNOSIS — D05.01 LOBULAR CARCINOMA IN SITU OF RIGHT BREAST: ICD-10-CM

## 2025-05-06 PROCEDURE — 77049 MRI BREAST C-+ W/CAD BI: CPT | Mod: 26

## 2025-05-06 PROCEDURE — C8937: CPT

## 2025-05-06 PROCEDURE — C8908: CPT

## 2025-05-06 PROCEDURE — A9585: CPT

## 2025-06-25 NOTE — ED ADULT TRIAGE NOTE - GLASGOW COMA SCALE: SCORE, MLM
4.6 - 8.0    Protein, Urine, POC Negative     Urobilinogen, POC Normal     Nitrite, Urine, POC Negative     Leukocyte Esterase, Urine, POC Negative          Vision Left Eye Right Eye Both   Uncorrected     Near 14/28 14/18 14/20   Far 20/25 20/25 20/30   Corrected      Near 14/ 14/ 14/   Far 20/ 20/ 20/   Color    _6_ /6 Peripheral (Degrees)     Right 210   Depth Perception Left              210    7 / 9 Nasal 210     Hearing:  Audiogram done?  _x_ YES   __ NO Report-Comment ___________   Otoscopic Exam  RIGHT  LEFT     Ear drums visible   x  x    Ear drums appear normal  x  x    Cone of light visible   x  x        Physical Exam  Vitals reviewed.   Constitutional:       General: She is not in acute distress.     Appearance: Normal appearance. She is not ill-appearing.   HENT:      Head: Normocephalic.      Right Ear: Tympanic membrane, ear canal and external ear normal. There is no impacted cerumen.      Left Ear: Tympanic membrane, ear canal and external ear normal. There is no impacted cerumen.      Nose: Nose normal.      Mouth/Throat:      Mouth: Mucous membranes are moist.      Pharynx: Oropharynx is clear.   Eyes:      General:         Right eye: No discharge.         Left eye: No discharge.      Extraocular Movements: Extraocular movements intact.      Conjunctiva/sclera: Conjunctivae normal.      Pupils: Pupils are equal, round, and reactive to light.   Cardiovascular:      Rate and Rhythm: Normal rate and regular rhythm.      Heart sounds: Normal heart sounds.   Pulmonary:      Effort: No respiratory distress.      Breath sounds: Normal breath sounds. No wheezing.   Abdominal:      General: Abdomen is flat. Bowel sounds are normal. There is no distension.      Palpations: Abdomen is soft.      Tenderness: There is no abdominal tenderness.   Genitourinary:     Comments: Differed exam  Musculoskeletal:         General: No swelling or tenderness. Normal range of motion.      Cervical back: Normal range of 
15